# Patient Record
Sex: FEMALE | Race: BLACK OR AFRICAN AMERICAN | Employment: OTHER | URBAN - METROPOLITAN AREA
[De-identification: names, ages, dates, MRNs, and addresses within clinical notes are randomized per-mention and may not be internally consistent; named-entity substitution may affect disease eponyms.]

---

## 2021-10-25 ENCOUNTER — APPOINTMENT (OUTPATIENT)
Dept: GENERAL RADIOLOGY | Age: 61
End: 2021-10-25
Attending: EMERGENCY MEDICINE
Payer: MEDICARE

## 2021-10-25 ENCOUNTER — HOSPITAL ENCOUNTER (EMERGENCY)
Age: 61
Discharge: HOME | End: 2021-10-25
Attending: EMERGENCY MEDICINE
Payer: MEDICARE

## 2021-10-25 VITALS
DIASTOLIC BLOOD PRESSURE: 93 MMHG | WEIGHT: 230 LBS | SYSTOLIC BLOOD PRESSURE: 171 MMHG | OXYGEN SATURATION: 100 % | TEMPERATURE: 98.6 F | HEART RATE: 79 BPM | RESPIRATION RATE: 16 BRPM

## 2021-10-25 DIAGNOSIS — J18.9 COMMUNITY ACQUIRED PNEUMONIA OF RIGHT MIDDLE LOBE OF LUNG: Primary | ICD-10-CM

## 2021-10-25 PROCEDURE — 74011250637 HC RX REV CODE- 250/637: Performed by: EMERGENCY MEDICINE

## 2021-10-25 PROCEDURE — 71046 X-RAY EXAM CHEST 2 VIEWS: CPT

## 2021-10-25 PROCEDURE — 99283 EMERGENCY DEPT VISIT LOW MDM: CPT

## 2021-10-25 RX ORDER — DOXYCYCLINE 100 MG/1
100 CAPSULE ORAL
Status: COMPLETED | OUTPATIENT
Start: 2021-10-25 | End: 2021-10-25

## 2021-10-25 RX ORDER — DOXYCYCLINE 100 MG/1
100 CAPSULE ORAL 2 TIMES DAILY
Qty: 14 CAPSULE | Refills: 0 | Status: SHIPPED | OUTPATIENT
Start: 2021-10-25 | End: 2021-11-01

## 2021-10-25 RX ADMIN — DOXYCYCLINE HYCLATE 100 MG: 100 CAPSULE ORAL at 21:33

## 2021-10-26 NOTE — ROUTINE PROCESS
Evy Melissa is a 59 y.o. female that was discharged in stable. Pt was accompanied by self. Pt is driving. The patients diagnosis, condition and treatment were explained to  patient and aftercare instructions were given. The patient verbalized understanding. Patient armband removed and shredded.

## 2021-10-26 NOTE — ED PROVIDER NOTES
EMERGENCY DEPARTMENT HISTORY AND PHYSICAL EXAM    8:03 PM  Date: (Not on file)  Patient Name: Zainab Beyer    History of Presenting Illness     No chief complaint on file. History Provided By: Patient    HPI: Zainab Beyer is a 59 y.o. female with history of hypertension. Patient is presenting with nonproductive cough for the past 3 days. She is also describing left-sided chest pain that is worse with cough and when she lays on her left side. Reports some shortness of breath as well. No history of fever or chills. Patient states that symptoms feel similar to her previous episodes of pneumonia and she usually gets pneumonia around this time of the year. Up-to-date on her Covid and flu vaccination. Denies any sick contacts. GI symptoms    Location:  Severity:  Timing/course: Onset/Duration:     PCP: No primary care provider on file. Past History     Past Medical History:  No past medical history on file. Past Surgical History:  No past surgical history on file. Family History:  No family history on file. Social History:  Social History     Tobacco Use    Smoking status: Not on file   Substance Use Topics    Alcohol use: Not on file    Drug use: Not on file       Allergies:  Not on File    Review of Systems   Review of Systems   Constitutional: Positive for fatigue. HENT: Positive for congestion. Respiratory: Positive for cough and shortness of breath. All other systems reviewed and are negative. Physical Exam     No data found. Physical Exam  Vitals and nursing note reviewed. Constitutional:       General: She is not in acute distress. Appearance: She is not ill-appearing. HENT:      Head: Normocephalic and atraumatic. Cardiovascular:      Rate and Rhythm: Normal rate. Heart sounds: Normal heart sounds. Pulmonary:      Effort: Pulmonary effort is normal.      Breath sounds: Examination of the right-middle field reveals rhonchi. Rhonchi present. Musculoskeletal:         General: Normal range of motion. Cervical back: Normal range of motion and neck supple. Skin:     General: Skin is warm and dry. Neurological:      General: No focal deficit present. Mental Status: She is alert and oriented to person, place, and time. Psychiatric:         Mood and Affect: Mood normal.         Behavior: Behavior normal.         Diagnostic Study Results     Labs -  No results found for this or any previous visit (from the past 12 hour(s)). Radiologic Studies -   No results found. Medical Decision Making     ED Course: Progress Notes, Reevaluation, and Consults:    8:03 PM Initial assessment performed. The patients presenting problems have been discussed, and they/their family are in agreement with the care plan formulated and outlined with them. I have encouraged them to ask questions as they arise throughout their visit. Provider Notes (Medical Decision Making): 73-year-old female presenting with shortness of breath and cough, URI symptoms for the past 3 days. She is very well-appearing on exam, not tachycardic nor hypoxic. The chest discomfort that she describes is only when she lays on her left side and not concerning for cardiac etiology, likely related to her cough. Chest x-ray was ordered and possibly right middle lobe pneumonia, some fluid layering on the lateral x-ray. I will treat the patient symptomatically with doxycycline. She did have some rhonchi in the right middle field. She is evaluate well-appearing and feels well right now and her vitals are or normal and seems reliable so I do not think she necessarily needs blood work because the patient qualifies for outpatient treatment. Will discharge on Doxy and strict return precautions. Patient verbalized understanding and feels comfortable with this plan. Procedures:     Critical Care Time:     Vital Signs-Reviewed the patient's vital signs.  Reviewed pt's pulse ox reading. EKG: Interpreted by the EP. Time Interpreted:    Rate:    Rhythm:    Interpretation:   Comparison:     Records Reviewed: Nursing Notes and Old Medical Records (Time of Review: 8:03 PM)  -I am the first provider for this patient.  -I reviewed the vital signs, available nursing notes, past medical history, past surgical history, family history and social history. Clinical Impression     Clinical Impression: No diagnosis found. Disposition: dc        This note was dictated utilizing voice recognition software which may lead to typographical errors. I apologize in advance if the situation occurs. If questions arise please do not hesitate to contact me or call our department.     Maryanne Chavez MD  8:03 PM

## 2022-07-25 ENCOUNTER — NEW PATIENT (OUTPATIENT)
Dept: URBAN - NONMETROPOLITAN AREA CLINIC 6 | Facility: CLINIC | Age: 62
End: 2022-07-25

## 2022-07-25 DIAGNOSIS — Z96.1: ICD-10-CM

## 2022-07-25 DIAGNOSIS — H10.13: ICD-10-CM

## 2022-07-25 PROCEDURE — 99203 OFFICE O/P NEW LOW 30 MIN: CPT

## 2022-07-25 RX ORDER — LOTEPREDNOL ETABONATE 5 MG/G: 1 GEL OPHTHALMIC TWICE A DAY

## 2022-07-25 ASSESSMENT — VISUAL ACUITY
OU_SC: 20/30
OD_SC: 20/25
OS_SC: 20/50-1

## 2022-12-23 ENCOUNTER — COMPREHENSIVE EXAM (OUTPATIENT)
Dept: URBAN - NONMETROPOLITAN AREA CLINIC 6 | Facility: CLINIC | Age: 62
End: 2022-12-23

## 2022-12-23 PROCEDURE — 92015 DETERMINE REFRACTIVE STATE: CPT

## 2022-12-23 PROCEDURE — 92014 COMPRE OPH EXAM EST PT 1/>: CPT

## 2022-12-23 ASSESSMENT — KERATOMETRY
OD_K1POWER_DIOPTERS: 43.75
OD_AXISANGLE_DEGREES: 102
OS_AXISANGLE2_DEGREES: 142
OD_K2POWER_DIOPTERS: 44.75
OS_K1POWER_DIOPTERS: 44.75
OS_K2POWER_DIOPTERS: 45.50
OS_AXISANGLE_DEGREES: 52
OD_AXISANGLE2_DEGREES: 12

## 2022-12-23 ASSESSMENT — VISUAL ACUITY
OS_SC: 20/40-2
OD_SC: 20/50
OU_SC: 20/40-2

## 2022-12-23 ASSESSMENT — TONOMETRY
OS_IOP_MMHG: 12
OD_IOP_MMHG: 13

## 2023-01-15 ENCOUNTER — HOSPITAL ENCOUNTER (EMERGENCY)
Age: 63
Discharge: HOME | End: 2023-01-15
Attending: EMERGENCY MEDICINE
Payer: MEDICARE

## 2023-01-15 VITALS
RESPIRATION RATE: 20 BRPM | TEMPERATURE: 99.4 F | WEIGHT: 217 LBS | SYSTOLIC BLOOD PRESSURE: 163 MMHG | DIASTOLIC BLOOD PRESSURE: 96 MMHG | HEART RATE: 101 BPM | BODY MASS INDEX: 34.06 KG/M2 | OXYGEN SATURATION: 98 % | HEIGHT: 67 IN

## 2023-01-15 DIAGNOSIS — L03.011 PARONYCHIA OF RIGHT MIDDLE FINGER: Primary | ICD-10-CM

## 2023-01-15 LAB — GLUCOSE BLD STRIP.AUTO-MCNC: 102 MG/DL (ref 70–110)

## 2023-01-15 PROCEDURE — 75810000289 HC I&D ABSCESS SIMP/COMP/MULT

## 2023-01-15 PROCEDURE — 82962 GLUCOSE BLOOD TEST: CPT

## 2023-01-15 PROCEDURE — 99283 EMERGENCY DEPT VISIT LOW MDM: CPT

## 2023-01-15 PROCEDURE — 74011000250 HC RX REV CODE- 250: Performed by: EMERGENCY MEDICINE

## 2023-01-15 RX ORDER — LIDOCAINE HYDROCHLORIDE 10 MG/ML
10 INJECTION, SOLUTION EPIDURAL; INFILTRATION; INTRACAUDAL; PERINEURAL ONCE
Status: COMPLETED | OUTPATIENT
Start: 2023-01-15 | End: 2023-01-15

## 2023-01-15 RX ORDER — AMOXICILLIN AND CLAVULANATE POTASSIUM 875; 125 MG/1; MG/1
1 TABLET, FILM COATED ORAL 2 TIMES DAILY
Qty: 20 TABLET | Refills: 0 | Status: SHIPPED | OUTPATIENT
Start: 2023-01-15 | End: 2023-01-25

## 2023-01-15 RX ORDER — IBUPROFEN 800 MG/1
800 TABLET ORAL EVERY 8 HOURS
Qty: 9 TABLET | Refills: 0 | Status: SHIPPED | OUTPATIENT
Start: 2023-01-15 | End: 2023-01-18

## 2023-01-15 RX ADMIN — LIDOCAINE HYDROCHLORIDE 10 ML: 10 INJECTION, SOLUTION EPIDURAL; INFILTRATION; INTRACAUDAL; PERINEURAL at 14:18

## 2023-01-15 NOTE — ED PROVIDER NOTES
EMERGENCY DEPARTMENT HISTORY AND PHYSICAL EXAM    2:04 PM seen at this time in triage room 1      Date: 1/15/2023  Patient Name: Reida Hodgkin    History of Presenting Illness     Chief Complaint   Patient presents with    Abscess         History Provided By: patient    Additional History (Context): Reida Hodgkin is a 57 y.o. female presents with borderline diabetes not on any medications  Has 4 days of pain forming at the third digit on the right side distal part, swelling and redness and whiteness forming thinks it started with an ingrown nail. Pain is severe. Pain is constant. Yessenia Ruffing PCP: Grace Carney MD    Chief Complaint:   Duration:    Timing:    Location:   Quality:   Severity:   Modifying Factors:   Associated Symptoms:       Current Facility-Administered Medications   Medication Dose Route Frequency Provider Last Rate Last Admin    lidocaine (PF) (XYLOCAINE) 10 mg/mL (1 %) injection 10 mL  10 mL IntraDERMal ONCE Juliette Cristobal MD           Past History     Past Medical History:  Past Medical History:   Diagnosis Date    Acid reflux     Aortic valve insufficiency     Bipolar 1 disorder (HCC)     Depression     Hypertension     Pneumonia     Renal cell carcinoma (HCC)        Past Surgical History:  Past Surgical History:   Procedure Laterality Date    HX NEPHRECTOMY         Family History:  No family history on file. Social History: Allergies:   Allergies   Allergen Reactions    Shrimp Hives    Ace Inhibitors Other (comments)    Fish Containing Products Other (comments)         Review of Systems     Review of Systems      Physical Exam       Patient Vitals for the past 12 hrs:   Temp Pulse Resp BP SpO2   01/15/23 1358 99.4 °F (37.4 °C) (!) 101 20 (!) 163/96 98 %       IPVITALS  Patient Vitals for the past 24 hrs:   BP Temp Pulse Resp SpO2 Height Weight   01/15/23 1358 (!) 163/96 99.4 °F (37.4 °C) (!) 101 20 98 % 5' 7\" (1.702 m) 98.4 kg (217 lb)       Physical Exam  Vitals and nursing note reviewed. Constitutional:       Appearance: She is well-developed. HENT:      Head: Normocephalic and atraumatic. Eyes:      General: No scleral icterus. Conjunctiva/sclera: Conjunctivae normal.   Neck:      Vascular: No JVD. Cardiovascular:      Rate and Rhythm: Normal rate and regular rhythm. Pulmonary:      Effort: Pulmonary effort is normal. No respiratory distress. Musculoskeletal:         General: Normal range of motion. Cervical back: Normal range of motion and neck supple. Comments: Right third digit, all surrounding the nail is fluctuant painful white and the finger is swollen suggesting a large paronychial abscess. Skin:     General: Skin is warm and dry. Neurological:      Mental Status: She is alert. Psychiatric:         Thought Content: Thought content normal.         Judgment: Judgment normal.         Diagnostic Study Results   Labs -  No results found for this or any previous visit (from the past 24 hour(s)). Radiologic Studies -   No orders to display     No results found. Medications ordered:   Medications   lidocaine (PF) (XYLOCAINE) 10 mg/mL (1 %) injection 10 mL (has no administration in time range)         Medical Decision Making   Initial Medical Decision Making and DDx:  Consistent with paronychial abscess, will drain. No complications to the drainage excellent anesthesia, patient tolerated well, home with antibiotics, her choice of pain medication is ibuprofen. Follow-up with hand surgery.   Not consistent with flexor tenosynovitis or felon    ED Course: Progress Notes, Reevaluation, and Consults:  ED Course as of 01/15/23 1528   Sun Danish 15, 2023   1457 Repeated lidocaine injection for digital block [CB]      ED Course User Index  [CB] Yelitza Estevez MD     I&D Abcess Simple    Date/Time: 1/15/2023 3:32 PM  Performed by: Yelitza Estevez MD  Authorized by: Yelitza Estevez MD     Location:     Indications for incision and drainage: Right third digit paronychia. Pre-procedure details:     Skin preparation:  Povidone-iodine  Anesthesia:     Anesthesia method:  Nerve block    Block needle gauge:  24 G    Block anesthetic:  Lidocaine 1% w/o epi    Block technique:  Right third digit digital block    Block outcome:  Anesthesia achieved  Procedure type:     Complexity:  Simple  Procedure details:     Incision types:  Single straight    Drainage:  Purulent    Drainage amount: Moderate    Wound treatment:  Wound left open  Post-procedure details:     Procedure completion:  Tolerated      I am the first provider for this patient. I reviewed the vital signs, available nursing notes, past medical history, past surgical history, family history and social history. Patient Vitals for the past 12 hrs:   Temp Pulse Resp BP SpO2   01/15/23 1358 99.4 °F (37.4 °C) (!) 101 20 (!) 163/96 98 %       Vital Signs-Reviewed the patient's vital signs. Pulse Oximetry Analysis, Cardiac Monitor, 12 lead ekg:      Interpreted by the EP. Records Reviewed: Nursing notes reviewed (Time of Review: 2:04 PM)    Procedures:   Critical Care Time: 0  If critical care time is note it is exclusive of any separately billable procedures. Aspirin: (was aspirin given for stroke?)    Diagnosis     Clinical Impression: No diagnosis found.     Disposition:       Follow-up Information    None          Patient's Medications    No medications on file     _______________________________    Notes:    Sheeba Purvis MD using Dragon dictation      _______________________________

## 2023-01-15 NOTE — ED NOTES
Pt is alert and oriented, and does not appear to be in distress. Pt tolerated procedure well. Will continue to monitor.

## 2023-01-15 NOTE — ED TRIAGE NOTES
Py arrives with a right middle finger injury/possible abscess. Pt states she may be diabetic and has a significant medical history. Pt recently had a TAVR procedure 10/2023. A/O x 4.

## 2023-01-20 ENCOUNTER — TELEPHONE (OUTPATIENT)
Dept: ORTHOPEDIC SURGERY | Age: 63
End: 2023-01-20

## 2023-01-20 NOTE — TELEPHONE ENCOUNTER
Driss Razo from [facility]  called and was checking to see if a referral for Lonnell Babinski has been received. She said she has faxed it twice. The referral is for Lonnell Babinski to see Dr. Tyler Sanchez has requested a call back at [phone].

## 2023-01-31 NOTE — TELEPHONE ENCOUNTER
We have not received her referral here at [facility] but I did call the patient to try to get her scheduled

## 2023-03-22 ENCOUNTER — OFFICE VISIT (OUTPATIENT)
Age: 63
End: 2023-03-22
Payer: COMMERCIAL

## 2023-03-22 VITALS
HEIGHT: 67 IN | HEART RATE: 77 BPM | TEMPERATURE: 97.9 F | WEIGHT: 213 LBS | OXYGEN SATURATION: 97 % | BODY MASS INDEX: 33.43 KG/M2

## 2023-03-22 DIAGNOSIS — M54.2 CERVICAL PAIN: ICD-10-CM

## 2023-03-22 DIAGNOSIS — M79.18 MYOFASCIAL PAIN: ICD-10-CM

## 2023-03-22 DIAGNOSIS — M54.50 LUMBAR PAIN: Primary | ICD-10-CM

## 2023-03-22 DIAGNOSIS — M25.511 BILATERAL SHOULDER PAIN, UNSPECIFIED CHRONICITY: ICD-10-CM

## 2023-03-22 DIAGNOSIS — R53.81 PHYSICAL DECONDITIONING: ICD-10-CM

## 2023-03-22 DIAGNOSIS — M25.512 BILATERAL SHOULDER PAIN, UNSPECIFIED CHRONICITY: ICD-10-CM

## 2023-03-22 DIAGNOSIS — M79.18 MUSCULOSKELETAL PAIN: ICD-10-CM

## 2023-03-22 DIAGNOSIS — R20.0 NUMBNESS OF LEFT LOWER EXTREMITY: ICD-10-CM

## 2023-03-22 DIAGNOSIS — M54.9 UPPER BACK PAIN: ICD-10-CM

## 2023-03-22 PROCEDURE — 1036F TOBACCO NON-USER: CPT | Performed by: PHYSICAL MEDICINE & REHABILITATION

## 2023-03-22 PROCEDURE — 1123F ACP DISCUSS/DSCN MKR DOCD: CPT | Performed by: PHYSICAL MEDICINE & REHABILITATION

## 2023-03-22 PROCEDURE — G8484 FLU IMMUNIZE NO ADMIN: HCPCS | Performed by: PHYSICAL MEDICINE & REHABILITATION

## 2023-03-22 PROCEDURE — 99204 OFFICE O/P NEW MOD 45 MIN: CPT | Performed by: PHYSICAL MEDICINE & REHABILITATION

## 2023-03-22 PROCEDURE — G8417 CALC BMI ABV UP PARAM F/U: HCPCS | Performed by: PHYSICAL MEDICINE & REHABILITATION

## 2023-03-22 PROCEDURE — G8400 PT W/DXA NO RESULTS DOC: HCPCS | Performed by: PHYSICAL MEDICINE & REHABILITATION

## 2023-03-22 PROCEDURE — G8427 DOCREV CUR MEDS BY ELIG CLIN: HCPCS | Performed by: PHYSICAL MEDICINE & REHABILITATION

## 2023-03-22 PROCEDURE — 3017F COLORECTAL CA SCREEN DOC REV: CPT | Performed by: PHYSICAL MEDICINE & REHABILITATION

## 2023-03-22 PROCEDURE — 1090F PRES/ABSN URINE INCON ASSESS: CPT | Performed by: PHYSICAL MEDICINE & REHABILITATION

## 2023-03-22 RX ORDER — EVOLOCUMAB 140 MG/ML
INJECTION, SOLUTION SUBCUTANEOUS
COMMUNITY
Start: 2023-02-21

## 2023-03-22 RX ORDER — TRAZODONE HYDROCHLORIDE 100 MG/1
100 TABLET ORAL DAILY
COMMUNITY

## 2023-03-22 RX ORDER — MECLIZINE HYDROCHLORIDE 25 MG/1
25 TABLET ORAL DAILY PRN
COMMUNITY
Start: 2019-05-20

## 2023-03-22 RX ORDER — FLUTICASONE PROPIONATE 50 MCG
SPRAY, SUSPENSION (ML) NASAL
COMMUNITY

## 2023-03-22 RX ORDER — TRAMADOL HYDROCHLORIDE 50 MG/1
50 TABLET ORAL EVERY 6 HOURS PRN
COMMUNITY
Start: 2022-10-28

## 2023-03-22 RX ORDER — MIRTAZAPINE 15 MG/1
15 TABLET, FILM COATED ORAL
COMMUNITY

## 2023-03-22 RX ORDER — FOLIC ACID 1 MG/1
TABLET ORAL DAILY
COMMUNITY

## 2023-03-22 RX ORDER — ALBUTEROL SULFATE 2.5 MG/3ML
2.5 SOLUTION RESPIRATORY (INHALATION) EVERY 6 HOURS PRN
COMMUNITY

## 2023-03-22 RX ORDER — PREGABALIN 75 MG/1
75 CAPSULE ORAL NIGHTLY
COMMUNITY

## 2023-03-22 RX ORDER — CETIRIZINE HYDROCHLORIDE 10 MG/1
TABLET ORAL
COMMUNITY

## 2023-03-22 RX ORDER — METOPROLOL SUCCINATE 25 MG/1
25 TABLET, EXTENDED RELEASE ORAL DAILY
COMMUNITY

## 2023-03-22 RX ORDER — ASPIRIN 81 MG/1
81 TABLET, CHEWABLE ORAL DAILY
COMMUNITY

## 2023-03-22 RX ORDER — VENLAFAXINE HYDROCHLORIDE 75 MG/1
CAPSULE, EXTENDED RELEASE ORAL DAILY
COMMUNITY

## 2023-03-22 RX ORDER — ROSUVASTATIN CALCIUM 20 MG/1
TABLET, FILM COATED ORAL
COMMUNITY
Start: 2023-01-31

## 2023-03-22 RX ORDER — MIRTAZAPINE 15 MG/1
TABLET, FILM COATED ORAL
COMMUNITY
Start: 2023-01-31

## 2023-03-22 RX ORDER — AMOXICILLIN AND CLAVULANATE POTASSIUM 875; 125 MG/1; MG/1
1 TABLET, FILM COATED ORAL 2 TIMES DAILY
COMMUNITY
Start: 2023-01-15

## 2023-03-22 RX ORDER — AMLODIPINE BESYLATE 10 MG
TABLET ORAL
COMMUNITY
Start: 2023-01-06

## 2023-03-22 RX ORDER — HYDROCODONE BITARTRATE AND ACETAMINOPHEN 5; 325 MG/1; MG/1
1 TABLET ORAL EVERY 6 HOURS PRN
COMMUNITY
Start: 2019-05-24

## 2023-03-22 RX ORDER — PHENTERMINE HYDROCHLORIDE 37.5 MG/1
37.5 TABLET ORAL
COMMUNITY

## 2023-03-22 RX ORDER — MOMETASONE FUROATE 50 UG/1
2 SPRAY, METERED NASAL DAILY
COMMUNITY

## 2023-03-22 RX ORDER — ISOSORBIDE MONONITRATE 30 MG/1
30 TABLET, EXTENDED RELEASE ORAL EVERY MORNING
COMMUNITY
Start: 2023-01-09

## 2023-03-22 RX ORDER — FERROUS SULFATE 325(65) MG
TABLET ORAL
COMMUNITY

## 2023-03-22 RX ORDER — ALBUTEROL SULFATE 90 UG/1
AEROSOL, METERED RESPIRATORY (INHALATION)
COMMUNITY
Start: 2023-02-01

## 2023-03-22 RX ORDER — ERGOCALCIFEROL 1.25 MG/1
50000 CAPSULE ORAL
COMMUNITY

## 2023-03-22 RX ORDER — SUNITINIB MALATE 37.5 MG/1
37.5 CAPSULE ORAL DAILY
COMMUNITY

## 2023-03-22 RX ORDER — ALBUTEROL SULFATE 90 UG/1
AEROSOL, METERED RESPIRATORY (INHALATION) EVERY 4 HOURS
COMMUNITY

## 2023-03-22 RX ORDER — AMITRIPTYLINE HYDROCHLORIDE 25 MG/1
50 TABLET, FILM COATED ORAL NIGHTLY
COMMUNITY

## 2023-03-22 RX ORDER — ONDANSETRON 4 MG/1
4 TABLET, ORALLY DISINTEGRATING ORAL
COMMUNITY

## 2023-03-22 RX ORDER — AMLODIPINE BESYLATE 10 MG/1
5 TABLET ORAL
COMMUNITY

## 2023-03-22 RX ORDER — GUAIFENESIN DEXTROMETHORPHAN HYDROBROMIDE ORAL SOLUTION 10; 100 MG/5ML; MG/5ML
10 SOLUTION ORAL EVERY 6 HOURS PRN
COMMUNITY
Start: 2020-06-13

## 2023-03-22 RX ORDER — ARIPIPRAZOLE 5 MG/1
5 TABLET ORAL
COMMUNITY

## 2023-03-22 RX ORDER — TOPIRAMATE 25 MG/1
25 TABLET ORAL
COMMUNITY

## 2023-03-22 RX ORDER — IBUPROFEN 800 MG/1
TABLET ORAL
COMMUNITY
Start: 2023-01-15

## 2023-03-22 RX ORDER — SUCRALFATE 1 G/1
1 TABLET ORAL
COMMUNITY
Start: 2019-03-22

## 2023-03-22 RX ORDER — HYDROXYZINE HYDROCHLORIDE 25 MG/1
25 TABLET, FILM COATED ORAL 3 TIMES DAILY
COMMUNITY

## 2023-03-22 RX ORDER — ISOSORBIDE MONONITRATE 30 MG/1
1 TABLET, EXTENDED RELEASE ORAL EVERY MORNING
COMMUNITY
Start: 2023-01-09

## 2023-03-22 RX ORDER — TOPIRAMATE 100 MG/1
100 TABLET, FILM COATED ORAL 2 TIMES DAILY
COMMUNITY
Start: 2023-02-27 | End: 2024-02-27

## 2023-03-22 RX ORDER — SERTRALINE HYDROCHLORIDE 100 MG/1
100 TABLET, FILM COATED ORAL DAILY
COMMUNITY

## 2023-03-22 RX ORDER — TRAZODONE HYDROCHLORIDE 100 MG/1
TABLET ORAL
COMMUNITY
Start: 2023-01-31

## 2023-03-22 RX ORDER — GABAPENTIN 300 MG/1
300 CAPSULE ORAL NIGHTLY
COMMUNITY

## 2023-03-22 RX ORDER — NITROGLYCERIN 0.4 MG/1
TABLET SUBLINGUAL
COMMUNITY
Start: 2023-02-04

## 2023-03-22 RX ORDER — ACETAMINOPHEN 325 MG/1
650 TABLET ORAL
COMMUNITY

## 2023-03-22 RX ORDER — VENLAFAXINE HYDROCHLORIDE 75 MG/1
CAPSULE, EXTENDED RELEASE ORAL
COMMUNITY
Start: 2023-01-31

## 2023-03-22 RX ORDER — CALCIUM CARBONATE/VITAMIN D3 600 MG-20
TABLET,CHEWABLE ORAL
COMMUNITY

## 2023-03-22 RX ORDER — OMEPRAZOLE 40 MG/1
CAPSULE, DELAYED RELEASE ORAL
COMMUNITY
Start: 2023-01-31

## 2023-03-22 RX ORDER — AMITRIPTYLINE HYDROCHLORIDE 150 MG/1
150 TABLET, FILM COATED ORAL DAILY
COMMUNITY

## 2023-03-22 RX ORDER — PANTOPRAZOLE SODIUM 40 MG/1
40 TABLET, DELAYED RELEASE ORAL DAILY
COMMUNITY
Start: 2019-03-18

## 2023-03-22 RX ORDER — ATORVASTATIN CALCIUM 40 MG/1
40 TABLET, FILM COATED ORAL NIGHTLY
COMMUNITY

## 2023-03-22 RX ORDER — ESTRADIOL 0.1 MG/G
CREAM VAGINAL
COMMUNITY
Start: 2022-05-16

## 2023-03-22 ASSESSMENT — PATIENT HEALTH QUESTIONNAIRE - PHQ9
SUM OF ALL RESPONSES TO PHQ QUESTIONS 1-9: 2
SUM OF ALL RESPONSES TO PHQ9 QUESTIONS 1 & 2: 2
2. FEELING DOWN, DEPRESSED OR HOPELESS: 1
1. LITTLE INTEREST OR PLEASURE IN DOING THINGS: 1
SUM OF ALL RESPONSES TO PHQ QUESTIONS 1-9: 2

## 2023-03-22 ASSESSMENT — ENCOUNTER SYMPTOMS
WHEEZING: 0
VOMITING: 0
NAUSEA: 0
TROUBLE SWALLOWING: 0
SHORTNESS OF BREATH: 0
BACK PAIN: 1

## 2023-03-22 NOTE — PROGRESS NOTES
Bert Sender presents today for   Chief Complaint   Patient presents with    Back Pain       Is someone accompanying this pt? no    Is the patient using any DME equipment during OV? no    Depression Screening:  No flowsheet data found. Learning Assessment:  No flowsheet data found. Abuse Screening:  No flowsheet data found. Fall Risk  No flowsheet data found. OPIOID RISK TOOL  No flowsheet data found. Coordination of Care:  1. Have you been to the ER, urgent care clinic since your last visit? no  Hospitalized since your last visit? no    2. Have you seen or consulted any other health care providers outside of the [address] [place] [address] since your last visit? no Include any pap smears or colon screening.  no
SPINE/MUSCULOSKELETAL EXAM  Back Exam     Tenderness   The patient is experiencing tenderness in the lumbar, cervical, thoracic and sacroiliac (lumbar paraspinals, cervical paraspinals, buttocks, SI joints, quadratus lumborum). Range of Motion   Extension:  normal   Flexion:  normal   Rotation right:  normal   Rotation left:  normal     Tests   Straight leg raise right: negative  Straight leg raise left: negative    Reflexes   Patellar:  2/4  Achilles:  2/4  Biceps:  2/4    Other   Toe walk: normal  Heel walk: normal  Sensation: decreased (Decreased sensation of LLE at medial left knee. Otherwise, normal sensation)      Right Shoulder Exam     Tenderness   The patient is experiencing no tenderness. Range of Motion   Active abduction:  abnormal     Tests   Parker test: positive           MOTOR:      Elbow Flex  Elbow Ext Arm Abd Wrist Ext Wrist Flex Hand Intrin   Right 5/5 5/5 5/5 5/5 5/5 5/5   Left 5/5 5/5 5/5 5/5 5/5 5/5             Hip flex  Knee Ext EHL Ankle DF Ankle PF      Right 5/5 5/5 5/5 5/5 5/5    Left 5/5 5/5 5/5 5/5 5/5        Ambulation without assistive device. FWB. ASSESSMENT  Patrick Guaman is a 70 y.o. female with chronic diffuse back pain. Her symptoms may be due to myofascial/musculoskeletal pain and lumbar facet arthropathy, based on diffuse tenderness throughout back and pain elicited upon lumbar extension>flexion. There may also be a component of physical deconditioning based on pmhx. She previously underwent treatment for kidney cancer; she is also s/p TAVR. She also has decreased sensation of medial left knee, and will further evaluate through MRI. She has previously had left knee surgery. She also c/o bilateral shoulder pain (R>>L), which may derive from a rotator cuff pathology based on positive Hawkin's sign of R.  Otherwise, patient is neurologically intact with normal strength and sensation       PLAN  Referral to PT - eval and treat of diffuse back pain (lumbar and

## 2023-04-04 ENCOUNTER — HOSPITAL ENCOUNTER (OUTPATIENT)
Facility: HOSPITAL | Age: 63
Setting detail: RECURRING SERIES
Discharge: HOME | End: 2023-04-07
Payer: COMMERCIAL

## 2023-04-04 PROCEDURE — 97161 PT EVAL LOW COMPLEX 20 MIN: CPT

## 2023-04-04 PROCEDURE — 97110 THERAPEUTIC EXERCISES: CPT

## 2023-04-04 PROCEDURE — 97140 MANUAL THERAPY 1/> REGIONS: CPT

## 2023-04-04 NOTE — PROGRESS NOTES
PHYSICAL / OCCUPATIONAL THERAPY - DAILY TREATMENT NOTE (updated )    Patient Name: Zoya Jackson    Date: 2023    : 1960  Insurance: Payor: Sven Gray / Plan: [employer] [place] / Product Type: *No Product type* /      Patient  verified Yes     Visit #   Current / Total 1 10   Time   In / Out 3:00 3:41   Pain   In / Out 6 6   Subjective Functional Status/Changes: See POC   Changes to:  Meds, Allergies, Med Hx, Sx Hx? If yes, update Summary List no       TREATMENT AREA =  Lumbar pain [M54.50]  Cervical pain [M54.2]  Myofascial pain [M79.18]  Musculoskeletal pain [M79.18]  Physical deconditioning [R53.81]  Numbness of left lower extremity [R20.0]  Upper back pain [M54.9]  Bilateral shoulder pain, unspecified chronicity [M25.511, M25.512]    OBJECTIVE    10 min   Eval - untimed                      Therapeutic Procedures: Tx Min Billable or 1:1 Min (if diff from Tx Min) Procedure, Rationale, Specifics   13 13 45028 Therapeutic Exercise (timed):  increase ROM, strength, coordination, balance, and proprioception to improve patient's ability to progress to PLOF and address remaining functional goals. (see flow sheet as applicable)     Details if applicable:  HEP instruction and demonstration     8 0 70025 Self Care/Home Management (timed):  improve patient knowledge and understanding of pain reducing techniques, positioning, posture/ergonomics, home safety, activity modification, diagnosis/prognosis, and physical therapy expectations, procedures and progression  to improve patient's ability to progress to PLOF and address remaining functional goals. (see flow sheet as applicable)     Details if applicable:  pt education on relevant anatomy/physiology    10 10 67892 Manual Therapy (timed):  decrease pain, increase ROM, increase tissue extensibility, decrease trigger points, and increase postural awareness to improve patient's ability to progress to PLOF and address remaining functional goals.   The manual
improve ability to perform household activities. Status at last note/certification: EXT 85-15% of WNL, flex 50-75% of WNL  4. Pt will report being able to walk several blocks with a little or no difficulty/pain to improve ability to tolerate community ambulation. Status at last note/certification limited a lot per FOTO     Frequency / Duration: Patient to be seen 2 times per week for 10 treatments  Goals will be assigned and reassessed every 10 visits/ 30 days per guidelines . Patient/ Caregiver education and instruction: Diagnosis, prognosis, self care, activity modification, and exercises [x]  Plan of care has been reviewed with DYLLAN Zamora, PT       4/4/2023       1:39 PM    I certify that the above Therapy Services are being furnished while the patient is under my care. I agree with the treatment plan and certify that this therapy is necessary. [de-identified] Signature:_________________________   DATE:_________   TIME:________                           Darryle Buttery, MD  Insurance: Payor: Bebo Garvin / Plan: [address] / Product Type: *No Product type* /      ** Signature, Date and Time must be completed for valid certification **  Please [facility] and [facility] or you may fax the signed copy to [phone]. Thank you.

## 2023-04-12 ENCOUNTER — APPOINTMENT (OUTPATIENT)
Facility: HOSPITAL | Age: 63
End: 2023-04-12
Payer: MEDICARE

## 2023-04-13 ENCOUNTER — HOSPITAL ENCOUNTER (OUTPATIENT)
Facility: HOSPITAL | Age: 63
Setting detail: RECURRING SERIES
Discharge: HOME | End: 2023-04-16
Payer: COMMERCIAL

## 2023-04-13 PROCEDURE — 97110 THERAPEUTIC EXERCISES: CPT

## 2023-04-13 PROCEDURE — 97112 NEUROMUSCULAR REEDUCATION: CPT

## 2023-04-14 ENCOUNTER — HOSPITAL ENCOUNTER (OUTPATIENT)
Facility: HOSPITAL | Age: 63
Setting detail: RECURRING SERIES
Discharge: HOME | End: 2023-04-17
Payer: COMMERCIAL

## 2023-04-14 PROCEDURE — 97110 THERAPEUTIC EXERCISES: CPT

## 2023-04-14 PROCEDURE — 97112 NEUROMUSCULAR REEDUCATION: CPT

## 2023-04-17 ENCOUNTER — HOSPITAL ENCOUNTER (OUTPATIENT)
Facility: HOSPITAL | Age: 63
Setting detail: RECURRING SERIES
Discharge: HOME | End: 2023-04-20
Payer: COMMERCIAL

## 2023-04-17 PROCEDURE — 97110 THERAPEUTIC EXERCISES: CPT

## 2023-04-17 PROCEDURE — 97112 NEUROMUSCULAR REEDUCATION: CPT

## 2023-04-17 NOTE — PROGRESS NOTES
CARDIAC EXERCISE [facility]   5/3/2023 11:10 AM Meri Robledo, PT [facility] [place]   5/5/2023 10:00 AM [facility] CARDIAC EXERCISE [facility]   5/8/2023 10:00 AM [facility] CARDIAC EXERCISE [facility]   5/8/2023 11:10 AM Mercy Roseareli, PTA [facility] [place]   5/10/2023 10:00 AM [facility] CARDIAC EXERCISE [facility]   5/12/2023 10:00 AM [facility] CARDIAC EXERCISE [facility]   5/15/2023 10:00 AM [facility] CARDIAC EXERCISE [facility]   5/17/2023 10:00 AM [facility] CARDIAC EXERCISE [facility]   5/19/2023 10:00 AM [facility] CARDIAC EXERCISE [facility]   5/22/2023 10:00 AM [facility] CARDIAC EXERCISE [facility]   5/24/2023 10:00 AM [facility] CARDIAC EXERCISE [facility]   5/26/2023 10:00 AM [facility] CARDIAC EXERCISE [facility]   5/31/2023 10:00 AM [facility] CARDIAC EXERCISE [facility]   6/2/2023 10:00 AM [facility] CARDIAC EXERCISE [facility]   6/5/2023 10:00 AM [facility] CARDIAC EXERCISE [facility]   6/7/2023 10:00 AM [facility] CARDIAC EXERCISE [facility]   6/9/2023 10:00 AM [facility] CARDIAC EXERCISE [facility]   7/10/2023 10:15 AM Shena Obregon MD [facility]

## 2023-04-18 ENCOUNTER — APPOINTMENT (OUTPATIENT)
Facility: HOSPITAL | Age: 63
End: 2023-04-18
Payer: MEDICARE

## 2023-04-19 ENCOUNTER — APPOINTMENT (OUTPATIENT)
Facility: HOSPITAL | Age: 63
End: 2023-04-19
Payer: MEDICARE

## 2023-04-20 ENCOUNTER — HOSPITAL ENCOUNTER (OUTPATIENT)
Facility: HOSPITAL | Age: 63
Setting detail: RECURRING SERIES
Discharge: HOME | End: 2023-04-23
Payer: COMMERCIAL

## 2023-04-20 VITALS — BODY MASS INDEX: 33.67 KG/M2 | WEIGHT: 215 LBS

## 2023-04-20 LAB — GLUCOSE BLD STRIP.AUTO-MCNC: 112 MG/DL (ref 70–110)

## 2023-04-20 PROCEDURE — 93798 PHYS/QHP OP CAR RHAB W/ECG: CPT

## 2023-04-20 PROCEDURE — 97112 NEUROMUSCULAR REEDUCATION: CPT

## 2023-04-20 PROCEDURE — 97110 THERAPEUTIC EXERCISES: CPT

## 2023-04-20 PROCEDURE — 82962 GLUCOSE BLOOD TEST: CPT

## 2023-04-20 PROCEDURE — 97140 MANUAL THERAPY 1/> REGIONS: CPT

## 2023-04-20 ASSESSMENT — EXERCISE STRESS TEST
PEAK_RPE: 13
PEAK_HR: 118
PEAK_METS: 3.1

## 2023-04-24 ENCOUNTER — HOSPITAL ENCOUNTER (OUTPATIENT)
Facility: HOSPITAL | Age: 63
Setting detail: RECURRING SERIES
Discharge: HOME | End: 2023-04-27
Payer: COMMERCIAL

## 2023-04-24 PROCEDURE — 97140 MANUAL THERAPY 1/> REGIONS: CPT

## 2023-04-24 PROCEDURE — 97110 THERAPEUTIC EXERCISES: CPT

## 2023-04-24 PROCEDURE — 97112 NEUROMUSCULAR REEDUCATION: CPT

## 2023-04-25 ENCOUNTER — HOSPITAL ENCOUNTER (OUTPATIENT)
Facility: HOSPITAL | Age: 63
Setting detail: RECURRING SERIES
Discharge: HOME | End: 2023-04-28
Payer: COMMERCIAL

## 2023-04-25 VITALS — WEIGHT: 214 LBS | BODY MASS INDEX: 33.52 KG/M2

## 2023-04-25 LAB — GLUCOSE BLD STRIP.AUTO-MCNC: 211 MG/DL (ref 70–110)

## 2023-04-25 PROCEDURE — 93798 PHYS/QHP OP CAR RHAB W/ECG: CPT

## 2023-04-25 PROCEDURE — 97140 MANUAL THERAPY 1/> REGIONS: CPT

## 2023-04-25 PROCEDURE — 97110 THERAPEUTIC EXERCISES: CPT

## 2023-04-25 PROCEDURE — 82962 GLUCOSE BLOOD TEST: CPT

## 2023-04-25 PROCEDURE — 97112 NEUROMUSCULAR REEDUCATION: CPT

## 2023-04-25 ASSESSMENT — EXERCISE STRESS TEST
PEAK_METS: 3.1
PEAK_HR: 123
PEAK_RPE: 13

## 2023-04-25 NOTE — PROGRESS NOTES
PHYSICAL / OCCUPATIONAL THERAPY - DAILY TREATMENT NOTE (updated )    Patient Name: Mihaela Love    Date: 2023    : 1960  Insurance: Payor: Abhishek Landeros / Plan: [employer] [place] / Product Type: *No Product type* /      Patient  verified Yes     Visit #   Current / Total 7 10   Time   In / Out 10:18 11:00   Pain   In / Out 2 2   Subjective Functional Status/Changes: Pt reports feeling better today   Changes to:  Meds, Allergies, Med Hx, Sx Hx? If yes, update Summary List no       TREATMENT AREA =  Lumbar pain [M54.50]  Cervical pain [M54.2]  Myofascial pain [M79.18]  Musculoskeletal pain [M79.18]  Physical deconditioning [R53.81]  Numbness of left lower extremity [R20.0]  Upper back pain [M54.9]  Bilateral shoulder pain, unspecified chronicity [M25.511, M25.512]    OBJECTIVE    Therapeutic Procedures: Tx Min Billable or 1:1 Min (if diff from Tx Min) Procedure, Rationale, Specifics   24  67541 Therapeutic Exercise (timed):  increase ROM, strength, coordination, balance, and proprioception to improve patient's ability to progress to PLOF and address remaining functional goals. (see flow sheet as applicable)     Details if applicable:       10  47235 Neuromuscular Re-Education (timed):  improve balance, coordination, kinesthetic sense, posture, core stability and proprioception to improve patient's ability to develop conscious control of individual muscles and awareness of position of extremities in order to progress to PLOF and address remaining functional goals. (see flow sheet as applicable)     Details if applicable:     8  99522 Manual Therapy (timed):  decrease pain, increase ROM, and increase tissue extensibility to improve patient's ability to progress to PLOF and address remaining functional goals. The manual therapy interventions were performed at a separate and distinct time from the therapeutic activities interventions .  (see flow sheet as applicable)     Details if applicable:  SI check; STM

## 2023-04-27 ENCOUNTER — HOSPITAL ENCOUNTER (OUTPATIENT)
Facility: HOSPITAL | Age: 63
Setting detail: RECURRING SERIES
Discharge: HOME | End: 2023-04-30
Payer: MEDICARE

## 2023-04-27 VITALS — BODY MASS INDEX: 33.52 KG/M2 | WEIGHT: 214 LBS

## 2023-04-27 LAB — GLUCOSE BLD STRIP.AUTO-MCNC: 88 MG/DL (ref 70–110)

## 2023-04-27 PROCEDURE — 93798 PHYS/QHP OP CAR RHAB W/ECG: CPT

## 2023-04-27 PROCEDURE — 82962 GLUCOSE BLOOD TEST: CPT

## 2023-04-27 ASSESSMENT — EXERCISE STRESS TEST
PEAK_METS: 3.1
PEAK_HR: 106
PEAK_RPE: 13

## 2023-05-01 ENCOUNTER — APPOINTMENT (OUTPATIENT)
Facility: HOSPITAL | Age: 63
End: 2023-05-01
Payer: MEDICARE

## 2023-05-01 ENCOUNTER — HOSPITAL ENCOUNTER (OUTPATIENT)
Facility: HOSPITAL | Age: 63
Setting detail: RECURRING SERIES
Discharge: HOME | End: 2023-05-04
Payer: MEDICARE

## 2023-05-01 VITALS — BODY MASS INDEX: 33.83 KG/M2 | WEIGHT: 216 LBS

## 2023-05-01 LAB — GLUCOSE BLD STRIP.AUTO-MCNC: 81 MG/DL (ref 70–110)

## 2023-05-01 PROCEDURE — 82962 GLUCOSE BLOOD TEST: CPT

## 2023-05-01 PROCEDURE — 93798 PHYS/QHP OP CAR RHAB W/ECG: CPT

## 2023-05-01 ASSESSMENT — EXERCISE STRESS TEST
PEAK_RPE: 13
PEAK_METS: 3.1
PEAK_HR: 103

## 2023-05-02 ENCOUNTER — APPOINTMENT (OUTPATIENT)
Facility: HOSPITAL | Age: 63
End: 2023-05-02
Payer: MEDICARE

## 2023-05-02 NOTE — PROGRESS NOTES
improve ability to perform household activities. Status at last note/certification: EXT 79-87% of WNL, flex 50-75% of WNL  Current: ext 50%; flex WNL partially met  4. Pt will report being able to walk several blocks with a little or no difficulty/pain to improve ability to tolerate community ambulation. Status at last note/certification limited a lot per FOTO   Current:   limited a lot             Goals/Measure of Progress: To be achieved in 4 weeks: 1. Pt will increase FOTO score to 44 points to improve ability to perform ADLs. Status at last note/certification: 35 points  PN:40  2. Pt will report an improvement in at best pain to 2/10 or better improve ability to tolerate sitting. Status at last note/certification 1/52 at best  PN :3/10  3. Pt will increase AROM l/s EXT to 50-75% of WNL, flex to % of WNL; with minimal to no increased pain to improve ability to perform household activities. Status at last note/certification: EXT 62-29% of WNL, flex 50-75% of WNL  PN: ext 50%; flex WNL partially met  4. Pt will report being able to walk several blocks with a little or no difficulty/pain to improve ability to tolerate community ambulation. Status at last note/certification limited a lot per FOTO   PN:  limited a lot               Summary of Care/ Key Functional Changes: Patient is progressing well towards goals. L/S flexion is no WNL without pain. Left hip IR is 50% less than right. Experienced left sided SI cavitation with prone manual IR associated with decreased pain post manual.  Patient will benefit from continuing with PT to further progress towards goals. ASSESSMENT/RECOMMENDATIONS:    Continue per plan of care.  2x a week x 4 weeks     Thank you for this referral.   Kun Rodrigez, PT 5/2/2023 4:09 PM
in at best pain to 2/10 or better improve ability to tolerate sitting. Status at last note/certification 0/28 at best  Current:4/10 4/24/23; 2/10 on 4/25/2023  3. Pt will increase AROM l/s EXT to 50-75% of WNL, flex to % of WNL; with minimal to no increased pain to improve ability to perform household activities. Status at last note/certification: EXT 79-74% of WNL, flex 50-75% of WNL  Current: ext 50%; flex WNL partially met 5/3/23  4. Pt will report being able to walk several blocks with a little or no difficulty/pain to improve ability to tolerate community ambulation.    Status at last note/certification limited a lot per FOTO   Current:   limited a lot             PLAN  Yes  Continue plan of care  []  Upgrade activities as tolerated  []  Discharge due to :  []  Other:    Ernesto Nina, PT, CMTPT    5/2/2023    4:08 PM    Future Appointments   Date Time Provider Annmarie Franco   5/3/2023 10:00  [facility]   5/3/2023 11:10 AM Ernesto Wood, PT Zeina Caripo   5/5/2023 10:00  [facility]   5/8/2023 10:00  [facility]   5/8/2023 11:10 AM John Matias PTA [facility]   5/10/2023 10:00  [facility]   5/12/2023 10:00  [facility]   5/15/2023 10:00  [facility]   5/17/2023 10:00  [facility]   5/19/2023 10:00  [facility]   5/22/2023 10:00 AM [facility] CARDIAC EXERCISE [facility]   5/24/2023 10:00 AM [facility] CARDIAC EXERCISE [facility]   5/26/2023 10:00 AM [facility] CARDIAC EXERCISE [facility]   5/31/2023 10:00 AM [facility] CARDIAC EXERCISE [facility]   6/2/2023 10:00 AM [facility] CARDIAC EXERCISE [facility]   6/5/2023 10:00 AM [address] [place] [address]   6/7/2023 10:00 AM [address] [place] [address]   6/9/2023 10:00 AM [address] [place] [address]   6/12/2023 10:00 AM [address] [place] [address]   6/14/2023 10:00 AM [facility] CARDIAC EXERCISE

## 2023-05-03 ENCOUNTER — HOSPITAL ENCOUNTER (OUTPATIENT)
Facility: HOSPITAL | Age: 63
Setting detail: RECURRING SERIES
Discharge: HOME | End: 2023-05-06
Payer: MEDICARE

## 2023-05-03 ENCOUNTER — APPOINTMENT (OUTPATIENT)
Facility: HOSPITAL | Age: 63
End: 2023-05-03
Payer: MEDICARE

## 2023-05-03 VITALS — WEIGHT: 216 LBS | BODY MASS INDEX: 33.83 KG/M2

## 2023-05-03 LAB — GLUCOSE BLD STRIP.AUTO-MCNC: 76 MG/DL (ref 70–110)

## 2023-05-03 PROCEDURE — 97140 MANUAL THERAPY 1/> REGIONS: CPT

## 2023-05-03 PROCEDURE — 93798 PHYS/QHP OP CAR RHAB W/ECG: CPT

## 2023-05-03 PROCEDURE — 97110 THERAPEUTIC EXERCISES: CPT

## 2023-05-03 PROCEDURE — 82962 GLUCOSE BLOOD TEST: CPT

## 2023-05-03 PROCEDURE — 97112 NEUROMUSCULAR REEDUCATION: CPT

## 2023-05-03 ASSESSMENT — EXERCISE STRESS TEST
PEAK_RPE: 13
PEAK_METS: 3.1
PEAK_HR: 110

## 2023-05-04 ENCOUNTER — APPOINTMENT (OUTPATIENT)
Facility: HOSPITAL | Age: 63
End: 2023-05-04
Payer: MEDICARE

## 2023-05-05 ENCOUNTER — APPOINTMENT (OUTPATIENT)
Facility: HOSPITAL | Age: 63
End: 2023-05-05
Payer: MEDICARE

## 2023-05-05 ENCOUNTER — HOSPITAL ENCOUNTER (OUTPATIENT)
Facility: HOSPITAL | Age: 63
Setting detail: RECURRING SERIES
Discharge: HOME | End: 2023-05-08
Payer: MEDICARE

## 2023-05-05 VITALS — WEIGHT: 216 LBS | BODY MASS INDEX: 33.83 KG/M2

## 2023-05-05 LAB — GLUCOSE BLD STRIP.AUTO-MCNC: 109 MG/DL (ref 70–110)

## 2023-05-05 PROCEDURE — 93798 PHYS/QHP OP CAR RHAB W/ECG: CPT

## 2023-05-05 PROCEDURE — 82962 GLUCOSE BLOOD TEST: CPT

## 2023-05-05 ASSESSMENT — EXERCISE STRESS TEST
PEAK_RPE: 13
PEAK_HR: 111
PEAK_METS: 3.1

## 2023-05-08 ENCOUNTER — HOSPITAL ENCOUNTER (OUTPATIENT)
Facility: HOSPITAL | Age: 63
Setting detail: RECURRING SERIES
Discharge: HOME | End: 2023-05-11
Payer: MEDICARE

## 2023-05-08 ENCOUNTER — APPOINTMENT (OUTPATIENT)
Facility: HOSPITAL | Age: 63
End: 2023-05-08
Payer: MEDICARE

## 2023-05-08 VITALS — BODY MASS INDEX: 33.83 KG/M2 | WEIGHT: 216 LBS

## 2023-05-08 LAB — GLUCOSE BLD STRIP.AUTO-MCNC: 92 MG/DL (ref 70–110)

## 2023-05-08 PROCEDURE — 97112 NEUROMUSCULAR REEDUCATION: CPT

## 2023-05-08 PROCEDURE — 82962 GLUCOSE BLOOD TEST: CPT

## 2023-05-08 PROCEDURE — 93798 PHYS/QHP OP CAR RHAB W/ECG: CPT

## 2023-05-08 PROCEDURE — 97140 MANUAL THERAPY 1/> REGIONS: CPT

## 2023-05-08 PROCEDURE — 97110 THERAPEUTIC EXERCISES: CPT

## 2023-05-08 ASSESSMENT — EXERCISE STRESS TEST
PEAK_RPE: 13
PEAK_METS: 3.1
PEAK_HR: 114

## 2023-05-08 NOTE — PROGRESS NOTES
PHYSICAL / OCCUPATIONAL THERAPY - DAILY TREATMENT NOTE (updated )    Patient Name: China García    Date: 2023    : 1960  Insurance: Payor: Celeste Esparza / Plan: Nedra Tyson / Product Type: *No Product type* /      Patient  verified Yes     Visit #   Current / Total 9 18   Time   In / Out 1110 1200   Pain   In / Out 6 4   Subjective Functional Status/Changes: Pt reports no changes since last visit. Changes to:  Meds, Allergies, Med Hx, Sx Hx? If yes, update Summary List no       TREATMENT AREA =  Lumbar pain [M54.50]  Cervical pain [M54.2]  Myofascial pain [M79.18]  Musculoskeletal pain [M79.18]  Physical deconditioning [R53.81]  Numbness of left lower extremity [R20.0]  Upper back pain [M54.9]  Bilateral shoulder pain, unspecified chronicity [M25.511, M25.512]    OBJECTIVE      Therapeutic Procedures: Tx Min Billable or 1:1 Min (if diff from Tx Min) Procedure, Rationale, Specifics   32 27 83900 Therapeutic Exercise (timed):  increase ROM, strength, coordination, balance, and proprioception to improve patient's ability to progress to PLOF and address remaining functional goals. (see flow sheet as applicable)     Details if applicable:       10 10 89159 Neuromuscular Re-Education (timed):  improve balance, coordination, kinesthetic sense, posture, core stability and proprioception to improve patient's ability to develop conscious control of individual muscles and awareness of position of extremities in order to progress to PLOF and address remaining functional goals. (see flow sheet as applicable)     Details if applicable:     8 8 28159 Manual Therapy (timed):  decrease pain, increase ROM, and increase tissue extensibility to improve patient's ability to progress to PLOF and address remaining functional goals. The manual therapy interventions were performed at a separate and distinct time from the therapeutic activities interventions .  (see flow sheet as applicable)

## 2023-05-09 ENCOUNTER — APPOINTMENT (OUTPATIENT)
Facility: HOSPITAL | Age: 63
End: 2023-05-09
Payer: MEDICARE

## 2023-05-10 ENCOUNTER — APPOINTMENT (OUTPATIENT)
Facility: HOSPITAL | Age: 63
End: 2023-05-10
Payer: MEDICARE

## 2023-05-10 ENCOUNTER — HOSPITAL ENCOUNTER (OUTPATIENT)
Facility: HOSPITAL | Age: 63
Setting detail: RECURRING SERIES
Discharge: HOME | End: 2023-05-13
Payer: MEDICARE

## 2023-05-10 VITALS — BODY MASS INDEX: 33.83 KG/M2 | WEIGHT: 216 LBS

## 2023-05-10 PROCEDURE — 93798 PHYS/QHP OP CAR RHAB W/ECG: CPT

## 2023-05-10 ASSESSMENT — PATIENT HEALTH QUESTIONNAIRE - PHQ9
5. POOR APPETITE OR OVEREATING: 1
SUM OF ALL RESPONSES TO PHQ QUESTIONS 1-9: 3
10. IF YOU CHECKED OFF ANY PROBLEMS, HOW DIFFICULT HAVE THESE PROBLEMS MADE IT FOR YOU TO DO YOUR WORK, TAKE CARE OF THINGS AT HOME, OR GET ALONG WITH OTHER PEOPLE: 0
9. THOUGHTS THAT YOU WOULD BE BETTER OFF DEAD, OR OF HURTING YOURSELF: 0
7. TROUBLE CONCENTRATING ON THINGS, SUCH AS READING THE NEWSPAPER OR WATCHING TELEVISION: 0
8. MOVING OR SPEAKING SO SLOWLY THAT OTHER PEOPLE COULD HAVE NOTICED. OR THE OPPOSITE, BEING SO FIGETY OR RESTLESS THAT YOU HAVE BEEN MOVING AROUND A LOT MORE THAN USUAL: 0
2. FEELING DOWN, DEPRESSED OR HOPELESS: 0
3. TROUBLE FALLING OR STAYING ASLEEP: 1
SUM OF ALL RESPONSES TO PHQ QUESTIONS 1-9: 3
6. FEELING BAD ABOUT YOURSELF - OR THAT YOU ARE A FAILURE OR HAVE LET YOURSELF OR YOUR FAMILY DOWN: 0
SUM OF ALL RESPONSES TO PHQ QUESTIONS 1-9: 3
1. LITTLE INTEREST OR PLEASURE IN DOING THINGS: 0
SUM OF ALL RESPONSES TO PHQ QUESTIONS 1-9: 3
4. FEELING TIRED OR HAVING LITTLE ENERGY: 1
SUM OF ALL RESPONSES TO PHQ9 QUESTIONS 1 & 2: 0

## 2023-05-10 ASSESSMENT — EXERCISE STRESS TEST
PEAK_RPE: 13
PEAK_METS: 3.1
PEAK_HR: 111

## 2023-05-11 ENCOUNTER — APPOINTMENT (OUTPATIENT)
Facility: HOSPITAL | Age: 63
End: 2023-05-11
Payer: MEDICARE

## 2023-05-11 ASSESSMENT — LIFESTYLE VARIABLES: SMOKELESS_TOBACCO: NO

## 2023-05-11 ASSESSMENT — EXERCISE STRESS TEST: PEAK_BP: 118/64

## 2023-05-11 ASSESSMENT — EJECTION FRACTION: EF_VALUE: 65

## 2023-05-11 NOTE — PROGRESS NOTES
CARDIAC REHAB ITP REASSESSMENT FOR REVIEW AND SIGNATURE  Patient name: Abram Kelly : 1960     Visits from Start of Care:                                    Reporting Period: 2023 - 2023     Subjective Reports: Progressing well despite some physical limitations. Goals Comments   1. Encourage healthy weight loss of 3-5 lbs by next recert period   [] met                  [] not met  [x] progressing Pt has a weight gain of 1 lb since start of program. Pt is encouraged to eat a heart healthy diet consisting of whole foods and minimal processed foods. 2. Maintain improved blood pressure (BP) control by next recert period     [] met                  [] not met  [x] progressing Pt BP is HTN on occasion. Consistent exercise, medication timing, sodium reduction and BP monitoring can assist in managing. 3. Eat a heart healthy diet by end of program   [] met                  [] not met  [x] progressing Pt is educated to make \"doable\" diet modifications and limit highly-processed foods for special occasions. Pt is encouraged to eat a Mediterranean-influenced diet, one that  includes lots of healthy foods like whole grains, fruits, vegetables, seafood, beans, and nuts. 4. Maintain target heart rate (THR) during exercise by end of program    [] met                  [] not met  [x] progressing Pt will be able to exercise and maintain THR between  during each session to maximize cardiac benefits. Key functional changes: Pt is able to complete 45 minutes of exercise     Problems/ barriers to goal attainment: Pt is currently having PT for spine. Assessment / Recommendations: Continue with rehab 2 times a week.       Cardiac ITP    Treatment Diagnosis  Treatment Diagnosis 1: Valve (TAVR)  Referral Date: 23  Significant Cardiovascular History:  (TAVR, HTN, DLD, DM2)         Oxygen Intervention  Oxygen Use: No  O2 Sat Greater Than 90%: Yes    Individual Treatment Plan  ITP

## 2023-05-12 ENCOUNTER — APPOINTMENT (OUTPATIENT)
Facility: HOSPITAL | Age: 63
End: 2023-05-12
Payer: MEDICARE

## 2023-05-15 ENCOUNTER — HOSPITAL ENCOUNTER (OUTPATIENT)
Facility: HOSPITAL | Age: 63
Setting detail: RECURRING SERIES
Discharge: HOME | End: 2023-05-18
Payer: MEDICARE

## 2023-05-15 ENCOUNTER — APPOINTMENT (OUTPATIENT)
Facility: HOSPITAL | Age: 63
End: 2023-05-15
Payer: MEDICARE

## 2023-05-15 VITALS — WEIGHT: 214 LBS | BODY MASS INDEX: 33.52 KG/M2

## 2023-05-15 PROCEDURE — 93798 PHYS/QHP OP CAR RHAB W/ECG: CPT

## 2023-05-15 ASSESSMENT — EXERCISE STRESS TEST
PEAK_HR: 118
PEAK_RPE: 14
PEAK_METS: 3.1

## 2023-05-16 ENCOUNTER — APPOINTMENT (OUTPATIENT)
Facility: HOSPITAL | Age: 63
End: 2023-05-16
Payer: MEDICARE

## 2023-05-17 ENCOUNTER — HOSPITAL ENCOUNTER (OUTPATIENT)
Facility: HOSPITAL | Age: 63
Setting detail: RECURRING SERIES
Discharge: HOME | End: 2023-05-20
Payer: MEDICARE

## 2023-05-17 ENCOUNTER — APPOINTMENT (OUTPATIENT)
Facility: HOSPITAL | Age: 63
End: 2023-05-17
Payer: MEDICARE

## 2023-05-17 VITALS — WEIGHT: 214 LBS | BODY MASS INDEX: 33.52 KG/M2

## 2023-05-17 PROCEDURE — 97112 NEUROMUSCULAR REEDUCATION: CPT

## 2023-05-17 PROCEDURE — 93798 PHYS/QHP OP CAR RHAB W/ECG: CPT

## 2023-05-17 PROCEDURE — 97110 THERAPEUTIC EXERCISES: CPT

## 2023-05-17 PROCEDURE — 97140 MANUAL THERAPY 1/> REGIONS: CPT

## 2023-05-17 ASSESSMENT — EXERCISE STRESS TEST
PEAK_RPE: 13
PEAK_HR: 116
PEAK_METS: 3.1

## 2023-05-17 NOTE — PROGRESS NOTES
PHYSICAL / OCCUPATIONAL THERAPY - DAILY TREATMENT NOTE (updated )    Patient Name: Annamaria Chika    Date: 2023    : 1960  Insurance: Payor: Jose Lucas / Plan: JANET GONZALEZ [facility] / Product Type: *No Product type* /      Patient  verified Yes     Visit #   Current / Total 10 18   Time   In / Out 12:57 1:47   Pain   In / Out 3 3   Subjective Functional Status/Changes: Pt states she has already done cardiac rehab this morning   Changes to:  Meds, Allergies, Med Hx, Sx Hx? If yes, update Summary List no       TREATMENT AREA =  Lumbar pain [M54.50]  Cervical pain [M54.2]  Myofascial pain [M79.18]  Musculoskeletal pain [M79.18]  Physical deconditioning [R53.81]  Numbness of left lower extremity [R20.0]  Upper back pain [M54.9]  Bilateral shoulder pain, unspecified chronicity [M25.511, M25.512]    OBJECTIVE    Therapeutic Procedures: Tx Min Billable or 1:1 Min (if diff from Tx Min) Procedure, Rationale, Specifics   27  21060 Therapeutic Exercise (timed):  increase ROM, strength, coordination, balance, and proprioception to improve patient's ability to progress to PLOF and address remaining functional goals. (see flow sheet as applicable)     Details if applicable:       15  78465 Neuromuscular Re-Education (timed):  improve balance, coordination, kinesthetic sense, posture, core stability and proprioception to improve patient's ability to develop conscious control of individual muscles and awareness of position of extremities in order to progress to PLOF and address remaining functional goals. (see flow sheet as applicable)     Details if applicable:     8  33589 Manual Therapy (timed):  decrease pain, increase ROM, and increase tissue extensibility to improve patient's ability to progress to PLOF and address remaining functional goals. The manual therapy interventions were performed at a separate and distinct time from the therapeutic activities interventions .  (see flow sheet as applicable)     Details if

## 2023-05-18 ENCOUNTER — APPOINTMENT (OUTPATIENT)
Facility: HOSPITAL | Age: 63
End: 2023-05-18
Payer: MEDICARE

## 2023-05-19 ENCOUNTER — APPOINTMENT (OUTPATIENT)
Facility: HOSPITAL | Age: 63
End: 2023-05-19
Payer: MEDICARE

## 2023-05-22 ENCOUNTER — HOSPITAL ENCOUNTER (OUTPATIENT)
Facility: HOSPITAL | Age: 63
Setting detail: RECURRING SERIES
End: 2023-05-22
Payer: MEDICARE

## 2023-05-22 ENCOUNTER — APPOINTMENT (OUTPATIENT)
Facility: HOSPITAL | Age: 63
End: 2023-05-22
Payer: MEDICARE

## 2023-05-22 ENCOUNTER — HOSPITAL ENCOUNTER (OUTPATIENT)
Facility: HOSPITAL | Age: 63
Setting detail: RECURRING SERIES
Discharge: HOME | End: 2023-05-25
Payer: MEDICARE

## 2023-05-22 VITALS — BODY MASS INDEX: 33.52 KG/M2 | WEIGHT: 214 LBS

## 2023-05-22 PROCEDURE — 93798 PHYS/QHP OP CAR RHAB W/ECG: CPT

## 2023-05-22 ASSESSMENT — EXERCISE STRESS TEST
PEAK_RPE: 13
PEAK_HR: 104
PEAK_METS: 3.1

## 2023-05-23 ENCOUNTER — APPOINTMENT (OUTPATIENT)
Facility: HOSPITAL | Age: 63
End: 2023-05-23
Payer: MEDICARE

## 2023-05-24 ENCOUNTER — APPOINTMENT (OUTPATIENT)
Facility: HOSPITAL | Age: 63
End: 2023-05-24
Payer: MEDICARE

## 2023-05-24 ENCOUNTER — HOSPITAL ENCOUNTER (OUTPATIENT)
Facility: HOSPITAL | Age: 63
Setting detail: RECURRING SERIES
Discharge: HOME | End: 2023-05-27
Payer: MEDICARE

## 2023-05-24 VITALS — BODY MASS INDEX: 33.2 KG/M2 | WEIGHT: 212 LBS

## 2023-05-24 PROCEDURE — 97140 MANUAL THERAPY 1/> REGIONS: CPT

## 2023-05-24 PROCEDURE — 97112 NEUROMUSCULAR REEDUCATION: CPT

## 2023-05-24 PROCEDURE — 97110 THERAPEUTIC EXERCISES: CPT

## 2023-05-24 PROCEDURE — 93798 PHYS/QHP OP CAR RHAB W/ECG: CPT

## 2023-05-24 ASSESSMENT — EXERCISE STRESS TEST
PEAK_HR: 112
PEAK_RPE: 13
PEAK_METS: 3.1

## 2023-05-24 NOTE — PROGRESS NOTES
25-50% of WNL, flex 50-75% of WNL  PN: ext 50%; flex WNL partially met  4. Pt will report being able to walk several blocks with a little or no difficulty/pain to improve ability to tolerate community ambulation.    Status at last note/certification limited a lot per FOTO   PN:  limited a lot                  PLAN  Yes  Continue plan of care  [x]  Upgrade activities as tolerated  []  Discharge due to :  []  Other:    Dave Bolden PTA    5/24/2023    11:39 AM    Future Appointments   Date Time Provider Annmarie Franco   [facility]   5/26/2023  9:00  [facility]   5/30/2023  9:10 AM [facility]   5/31/2023  9:00  [facility]   6/1/2023  9:10 AM DYLLAN Crystal [facility]   6/2/2023  9:00  [facility]   6/5/2023  9:00  [facility]   6/5/2023 12:30 PM DYLLAN Crystal [facility]   6/7/2023  9:00  [facility]   6/7/2023 11:50 AM Nicholas Silva PT [facility]   6/9/2023  9:00  [facility]   6/12/2023  9:00  [facility]   6/14/2023  9:00 AM [facility] CARDIAC EXERCISE [facility]   6/16/2023  9:00 AM [facility] CARDIAC EXERCISE [facility]   6/19/2023  9:00 AM [facility] CARDIAC EXERCISE [facility]   6/21/2023  9:00 AM [facility] CARDIAC EXERCISE [facility]   6/23/2023  9:00 AM [facility] CARDIAC EXERCISE [facility]   6/26/2023  9:00 AM [facility] CARDIAC EXERCISE [facility]   6/28/2023  9:00 AM [facility] CARDIAC EXERCISE [facility]   6/30/2023  9:00 AM [facility] CARDIAC EXERCISE [facility]   7/3/2023  9:00 AM [facility] CARDIAC EXERCISE [facility]   7/5/2023  9:00 AM [facility] CARDIAC EXERCISE [facility]   7/7/2023  9:00 AM [facility] CARDIAC EXERCISE [facility]   7/10/2023 10:15 AM Shena Finn MD [facility]

## 2023-05-25 ENCOUNTER — APPOINTMENT (OUTPATIENT)
Facility: HOSPITAL | Age: 63
End: 2023-05-25
Payer: MEDICARE

## 2023-05-26 ENCOUNTER — APPOINTMENT (OUTPATIENT)
Facility: HOSPITAL | Age: 63
End: 2023-05-26
Payer: MEDICARE

## 2023-05-30 ENCOUNTER — APPOINTMENT (OUTPATIENT)
Facility: HOSPITAL | Age: 63
End: 2023-05-30
Payer: MEDICARE

## 2023-05-31 ENCOUNTER — APPOINTMENT (OUTPATIENT)
Facility: HOSPITAL | Age: 63
End: 2023-05-31
Payer: MEDICARE

## 2023-06-01 ENCOUNTER — APPOINTMENT (OUTPATIENT)
Facility: HOSPITAL | Age: 63
End: 2023-06-01
Payer: MEDICARE

## 2023-06-02 ENCOUNTER — APPOINTMENT (OUTPATIENT)
Facility: HOSPITAL | Age: 63
End: 2023-06-02
Payer: MEDICARE

## 2023-06-05 ENCOUNTER — TELEPHONE (OUTPATIENT)
Facility: HOSPITAL | Age: 63
End: 2023-06-05

## 2023-06-05 ENCOUNTER — HOSPITAL ENCOUNTER (OUTPATIENT)
Facility: HOSPITAL | Age: 63
Setting detail: RECURRING SERIES
Discharge: HOME | End: 2023-06-08
Payer: MEDICARE

## 2023-06-05 VITALS — WEIGHT: 215 LBS | BODY MASS INDEX: 33.67 KG/M2

## 2023-06-05 PROCEDURE — 93798 PHYS/QHP OP CAR RHAB W/ECG: CPT

## 2023-06-05 ASSESSMENT — EXERCISE STRESS TEST
PEAK_RPE: 13
PEAK_METS: 3.1
PEAK_HR: 109

## 2023-06-06 ENCOUNTER — HOSPITAL ENCOUNTER (OUTPATIENT)
Facility: HOSPITAL | Age: 63
Setting detail: RECURRING SERIES
Discharge: HOME | End: 2023-06-09
Payer: MEDICARE

## 2023-06-06 VITALS — BODY MASS INDEX: 33.67 KG/M2 | WEIGHT: 215 LBS

## 2023-06-06 PROCEDURE — 93798 PHYS/QHP OP CAR RHAB W/ECG: CPT

## 2023-06-06 ASSESSMENT — EXERCISE STRESS TEST
PEAK_HR: 126
PEAK_RPE: 14
PEAK_METS: 3.1

## 2023-06-07 ENCOUNTER — APPOINTMENT (OUTPATIENT)
Facility: HOSPITAL | Age: 63
End: 2023-06-07
Payer: MEDICARE

## 2023-06-14 ENCOUNTER — APPOINTMENT (OUTPATIENT)
Facility: HOSPITAL | Age: 63
End: 2023-06-14
Payer: MEDICARE

## 2023-06-16 ENCOUNTER — APPOINTMENT (OUTPATIENT)
Facility: HOSPITAL | Age: 63
End: 2023-06-16
Payer: MEDICARE

## 2023-06-19 ENCOUNTER — APPOINTMENT (OUTPATIENT)
Facility: HOSPITAL | Age: 63
End: 2023-06-19
Payer: MEDICARE

## 2023-06-21 ENCOUNTER — APPOINTMENT (OUTPATIENT)
Facility: HOSPITAL | Age: 63
End: 2023-06-21
Payer: MEDICARE

## 2023-06-23 ENCOUNTER — APPOINTMENT (OUTPATIENT)
Facility: HOSPITAL | Age: 63
End: 2023-06-23
Payer: MEDICARE

## 2023-06-26 ENCOUNTER — APPOINTMENT (OUTPATIENT)
Facility: HOSPITAL | Age: 63
End: 2023-06-26
Payer: MEDICARE

## 2023-06-28 ENCOUNTER — APPOINTMENT (OUTPATIENT)
Facility: HOSPITAL | Age: 63
End: 2023-06-28
Payer: MEDICARE

## 2023-06-30 ENCOUNTER — APPOINTMENT (OUTPATIENT)
Facility: HOSPITAL | Age: 63
End: 2023-06-30
Payer: MEDICARE

## 2023-07-03 ENCOUNTER — APPOINTMENT (OUTPATIENT)
Facility: HOSPITAL | Age: 63
End: 2023-07-03
Payer: MEDICARE

## 2023-07-05 ENCOUNTER — APPOINTMENT (OUTPATIENT)
Facility: HOSPITAL | Age: 63
End: 2023-07-05
Payer: MEDICARE

## 2023-07-07 ENCOUNTER — APPOINTMENT (OUTPATIENT)
Facility: HOSPITAL | Age: 63
End: 2023-07-07
Payer: MEDICARE

## 2023-07-10 ENCOUNTER — APPOINTMENT (OUTPATIENT)
Facility: HOSPITAL | Age: 63
End: 2023-07-10
Payer: MEDICARE

## 2023-07-12 ENCOUNTER — APPOINTMENT (OUTPATIENT)
Facility: HOSPITAL | Age: 63
End: 2023-07-12
Payer: MEDICARE

## 2023-07-14 ENCOUNTER — APPOINTMENT (OUTPATIENT)
Facility: HOSPITAL | Age: 63
End: 2023-07-14
Payer: MEDICARE

## 2023-07-17 ENCOUNTER — APPOINTMENT (OUTPATIENT)
Facility: HOSPITAL | Age: 63
End: 2023-07-17
Payer: MEDICARE

## 2023-07-19 ENCOUNTER — APPOINTMENT (OUTPATIENT)
Facility: HOSPITAL | Age: 63
End: 2023-07-19
Payer: MEDICARE

## 2023-07-21 ENCOUNTER — APPOINTMENT (OUTPATIENT)
Facility: HOSPITAL | Age: 63
End: 2023-07-21
Payer: MEDICARE

## 2023-07-24 ENCOUNTER — APPOINTMENT (OUTPATIENT)
Facility: HOSPITAL | Age: 63
End: 2023-07-24
Payer: MEDICARE

## 2023-07-24 ENCOUNTER — HOSPITAL ENCOUNTER (OUTPATIENT)
Facility: HOSPITAL | Age: 63
Setting detail: RECURRING SERIES
Discharge: HOME | End: 2023-07-27
Payer: MEDICARE

## 2023-07-24 VITALS — BODY MASS INDEX: 33.05 KG/M2 | WEIGHT: 211 LBS

## 2023-07-24 PROCEDURE — 93798 PHYS/QHP OP CAR RHAB W/ECG: CPT

## 2023-07-24 ASSESSMENT — EXERCISE STRESS TEST
PEAK_METS: 4.1
PEAK_RPE: 13
PEAK_HR: 118

## 2023-07-26 ENCOUNTER — APPOINTMENT (OUTPATIENT)
Facility: HOSPITAL | Age: 63
End: 2023-07-26
Payer: MEDICARE

## 2023-07-28 ENCOUNTER — HOSPITAL ENCOUNTER (OUTPATIENT)
Facility: HOSPITAL | Age: 63
Setting detail: RECURRING SERIES
Discharge: HOME | End: 2023-07-31
Payer: MEDICARE

## 2023-07-28 ENCOUNTER — APPOINTMENT (OUTPATIENT)
Facility: HOSPITAL | Age: 63
End: 2023-07-28
Payer: MEDICARE

## 2023-07-28 VITALS — BODY MASS INDEX: 33.05 KG/M2 | WEIGHT: 211 LBS

## 2023-07-28 LAB — GLUCOSE BLD STRIP.AUTO-MCNC: 114 MG/DL (ref 70–110)

## 2023-07-28 PROCEDURE — 82962 GLUCOSE BLOOD TEST: CPT

## 2023-07-28 PROCEDURE — 93798 PHYS/QHP OP CAR RHAB W/ECG: CPT

## 2023-07-28 ASSESSMENT — EXERCISE STRESS TEST
PEAK_HR: 104
PEAK_METS: 4.7
PEAK_RPE: 14

## 2023-07-31 ENCOUNTER — HOSPITAL ENCOUNTER (OUTPATIENT)
Facility: HOSPITAL | Age: 63
Setting detail: RECURRING SERIES
Discharge: HOME | End: 2023-08-03
Payer: MEDICARE

## 2023-07-31 ENCOUNTER — APPOINTMENT (OUTPATIENT)
Facility: HOSPITAL | Age: 63
End: 2023-07-31
Payer: MEDICARE

## 2023-07-31 VITALS — BODY MASS INDEX: 33.83 KG/M2 | WEIGHT: 216 LBS

## 2023-07-31 LAB — GLUCOSE BLD STRIP.AUTO-MCNC: 78 MG/DL (ref 70–110)

## 2023-07-31 PROCEDURE — 82962 GLUCOSE BLOOD TEST: CPT

## 2023-07-31 PROCEDURE — 93798 PHYS/QHP OP CAR RHAB W/ECG: CPT

## 2023-07-31 ASSESSMENT — EXERCISE STRESS TEST
PEAK_RPE: 13
PEAK_HR: 93
PEAK_METS: 4.7

## 2023-08-02 ENCOUNTER — HOSPITAL ENCOUNTER (OUTPATIENT)
Facility: HOSPITAL | Age: 63
Setting detail: RECURRING SERIES
Discharge: HOME | End: 2023-08-05
Payer: MEDICARE

## 2023-08-02 ENCOUNTER — APPOINTMENT (OUTPATIENT)
Facility: HOSPITAL | Age: 63
End: 2023-08-02
Payer: MEDICARE

## 2023-08-02 VITALS — WEIGHT: 213 LBS | BODY MASS INDEX: 33.36 KG/M2

## 2023-08-02 PROCEDURE — 93798 PHYS/QHP OP CAR RHAB W/ECG: CPT

## 2023-08-02 ASSESSMENT — EXERCISE STRESS TEST
PEAK_HR: 96
PEAK_RPE: 15
PEAK_METS: 4.1

## 2023-08-03 ASSESSMENT — EJECTION FRACTION: EF_VALUE: 65

## 2023-08-03 ASSESSMENT — LIFESTYLE VARIABLES: SMOKELESS_TOBACCO: NO

## 2023-08-03 ASSESSMENT — EXERCISE STRESS TEST: PEAK_BP: 132/67

## 2023-08-03 NOTE — PROGRESS NOTES
with no complaints. Problems/ barriers to goal attainment: None noted during this visit     Assessment / Recommendations: Continue with rehab 2X times a week. Cardiac ITP    Treatment Diagnosis  Treatment Diagnosis 1: Valve (TAVR)  Referral Date: 03/23/23  Significant Cardiovascular History:  (TAVR, HTN, DLD, DM2)         Oxygen Intervention  Oxygen Use: No  O2 Sat Greater Than 90%: Yes    Individual Treatment Plan  ITP Visit Type: Re-assessment  1st Date of Exercise : 04/12/23  ITP Next Review Date: 08/31/23  Visit #/Total Visits: 21/36  EF%: 65 %  Risk Stratification: High  ITP: Exercise, Psychosocial, Tobacco, Nutrition, Education   Exercise   Stages of Change: Action  Assisted Devices: None         Exercise Prescription  Mode: Treadmill, Bike, Stepper, Ergometer, Rower, Other (comment)  Frequency per week: 2-3  Duration Per Session: 31-55  Intensity METS      : 2.45-4.45  RPE: 12-16  Progression: Progressing  Target Heart Rate:   Resistance Training: No   Exercise Blood Pressures  Resting BP: 145/85  Peak BP: 132/67  Is BP WDL: No    Exercise Activity at Home  Type: spinal rehab  Frequency: 2-3  Duration: 45-1 hr  Resistance Training: No    Exercise Education  Education: Self pulse, Exercise safety, Signs/symptoms to report, RPE scale, Equipment orientation, Warm up/cool down, Physically active    Exercise Target Goal  Target Goal(s):  Individual exercise RX, Aerobic activity 30 + minutes/day  5 days/week, BP < 140/90 or < 130/80, if DM or CKD  Patient Stated Exercise Goals: Ready to go back to living and doing things, it's going slow    Psychosocial  Stages of Change: Action    Psychosocial Intervention  Interventions: No intervention indicated  Currently Taking Psychotropic Meds: Yes  Medication Changes: No    Psychosocial Education  Education: Advanced directives, Benefits of CPR completion, Cardiac meds, Coping techniques, Environmental triggers, Impact self care behaviors on health, Relaxation

## 2023-08-04 ENCOUNTER — HOSPITAL ENCOUNTER (OUTPATIENT)
Facility: HOSPITAL | Age: 63
Setting detail: RECURRING SERIES
Discharge: HOME | End: 2023-08-07
Payer: MEDICARE

## 2023-08-04 ENCOUNTER — APPOINTMENT (OUTPATIENT)
Facility: HOSPITAL | Age: 63
End: 2023-08-04
Payer: MEDICARE

## 2023-08-04 VITALS — WEIGHT: 212 LBS | BODY MASS INDEX: 33.2 KG/M2

## 2023-08-04 PROCEDURE — 93798 PHYS/QHP OP CAR RHAB W/ECG: CPT

## 2023-08-04 ASSESSMENT — PATIENT HEALTH QUESTIONNAIRE - PHQ9
6. FEELING BAD ABOUT YOURSELF - OR THAT YOU ARE A FAILURE OR HAVE LET YOURSELF OR YOUR FAMILY DOWN: 0
8. MOVING OR SPEAKING SO SLOWLY THAT OTHER PEOPLE COULD HAVE NOTICED. OR THE OPPOSITE, BEING SO FIGETY OR RESTLESS THAT YOU HAVE BEEN MOVING AROUND A LOT MORE THAN USUAL: 0
SUM OF ALL RESPONSES TO PHQ QUESTIONS 1-9: 2
1. LITTLE INTEREST OR PLEASURE IN DOING THINGS: 0
3. TROUBLE FALLING OR STAYING ASLEEP: 1
7. TROUBLE CONCENTRATING ON THINGS, SUCH AS READING THE NEWSPAPER OR WATCHING TELEVISION: 0
SUM OF ALL RESPONSES TO PHQ QUESTIONS 1-9: 2
SUM OF ALL RESPONSES TO PHQ9 QUESTIONS 1 & 2: 0
10. IF YOU CHECKED OFF ANY PROBLEMS, HOW DIFFICULT HAVE THESE PROBLEMS MADE IT FOR YOU TO DO YOUR WORK, TAKE CARE OF THINGS AT HOME, OR GET ALONG WITH OTHER PEOPLE: 0
9. THOUGHTS THAT YOU WOULD BE BETTER OFF DEAD, OR OF HURTING YOURSELF: 0
5. POOR APPETITE OR OVEREATING: 0
4. FEELING TIRED OR HAVING LITTLE ENERGY: 1
SUM OF ALL RESPONSES TO PHQ QUESTIONS 1-9: 2
2. FEELING DOWN, DEPRESSED OR HOPELESS: 0
SUM OF ALL RESPONSES TO PHQ QUESTIONS 1-9: 2

## 2023-08-04 ASSESSMENT — EXERCISE STRESS TEST
PEAK_METS: 4.1
PEAK_HR: 97
PEAK_RPE: 13

## 2023-08-07 ENCOUNTER — HOSPITAL ENCOUNTER (OUTPATIENT)
Facility: HOSPITAL | Age: 63
Setting detail: RECURRING SERIES
Discharge: HOME | End: 2023-08-10
Payer: MEDICARE

## 2023-08-07 ENCOUNTER — APPOINTMENT (OUTPATIENT)
Facility: HOSPITAL | Age: 63
End: 2023-08-07
Payer: MEDICARE

## 2023-08-07 VITALS — BODY MASS INDEX: 33.2 KG/M2 | WEIGHT: 212 LBS

## 2023-08-07 PROCEDURE — 93798 PHYS/QHP OP CAR RHAB W/ECG: CPT

## 2023-08-07 ASSESSMENT — EXERCISE STRESS TEST
PEAK_METS: 4.1
PEAK_HR: 101
PEAK_RPE: 13

## 2023-08-09 ENCOUNTER — APPOINTMENT (OUTPATIENT)
Facility: HOSPITAL | Age: 63
End: 2023-08-09
Payer: MEDICARE

## 2023-08-11 ENCOUNTER — APPOINTMENT (OUTPATIENT)
Facility: HOSPITAL | Age: 63
End: 2023-08-11
Payer: MEDICARE

## 2023-08-12 ENCOUNTER — APPOINTMENT (OUTPATIENT)
Facility: HOSPITAL | Age: 63
End: 2023-08-12
Payer: COMMERCIAL

## 2023-08-12 ENCOUNTER — HOSPITAL ENCOUNTER (EMERGENCY)
Facility: HOSPITAL | Age: 63
Discharge: HOME | End: 2023-08-12
Attending: EMERGENCY MEDICINE | Admitting: EMERGENCY MEDICINE
Payer: COMMERCIAL

## 2023-08-12 VITALS
HEART RATE: 89 BPM | SYSTOLIC BLOOD PRESSURE: 134 MMHG | TEMPERATURE: 99.7 F | RESPIRATION RATE: 16 BRPM | DIASTOLIC BLOOD PRESSURE: 57 MMHG | HEIGHT: 67 IN | OXYGEN SATURATION: 98 % | BODY MASS INDEX: 32.96 KG/M2 | WEIGHT: 210 LBS

## 2023-08-12 DIAGNOSIS — U07.1 COVID-19: Primary | ICD-10-CM

## 2023-08-12 LAB
DEPRECATED S PYO AG THROAT QL EIA: NEGATIVE
SARS-COV-2 RDRP RESP QL NAA+PROBE: DETECTED
SOURCE: ABNORMAL

## 2023-08-12 PROCEDURE — 87880 STREP A ASSAY W/OPTIC: CPT

## 2023-08-12 PROCEDURE — 87635 SARS-COV-2 COVID-19 AMP PRB: CPT

## 2023-08-12 PROCEDURE — 99284 EMERGENCY DEPT VISIT MOD MDM: CPT

## 2023-08-12 PROCEDURE — 71045 X-RAY EXAM CHEST 1 VIEW: CPT

## 2023-08-12 PROCEDURE — 87070 CULTURE OTHR SPECIMN AEROBIC: CPT

## 2023-08-12 ASSESSMENT — PAIN - FUNCTIONAL ASSESSMENT: PAIN_FUNCTIONAL_ASSESSMENT: 0-10

## 2023-08-12 ASSESSMENT — PAIN DESCRIPTION - ORIENTATION: ORIENTATION: RIGHT

## 2023-08-12 ASSESSMENT — LIFESTYLE VARIABLES
HOW MANY STANDARD DRINKS CONTAINING ALCOHOL DO YOU HAVE ON A TYPICAL DAY: PATIENT DOES NOT DRINK
HOW OFTEN DO YOU HAVE A DRINK CONTAINING ALCOHOL: NEVER

## 2023-08-12 ASSESSMENT — PAIN SCALES - GENERAL: PAINLEVEL_OUTOF10: 9

## 2023-08-12 NOTE — ED NOTES
Rn went over all discharge instructions. Patient educated on quarantine and medications. Verbalized understandings.       Luisa Kern RN  08/12/23 1178

## 2023-08-12 NOTE — ED PROVIDER NOTES
EMERGENCY DEPARTMENT HISTORY AND PHYSICAL EXAM      Patient Name: Garth Diaz  MRN: [id number]  YOB: 1960  Provider: Yves Zhao MD  PCP: Inna Sheldon MD   Time/Date of evaluation: 12:21 PM EDT on 8/12/23    History of Presenting Illness     Chief Complaint   Patient presents with    Cough    URI    Headache    Otalgia       History Provided By: {History Source:66670::\"Patient\"}     History (Narative): Garth Diaz is a 70 y.o. female {PMHx:95298::\"with a PMHX of ***\"} who presents to the emergency department  {Arrival WIH:78175} C/O *** onset ***. ***        Past History     Past Medical History:  Past Medical History:   Diagnosis Date    Acid reflux     Aortic valve insufficiency     Bipolar 1 disorder (HCC)     Depression     Hypertension     Pneumonia     Renal cell carcinoma (HCC)        Past Surgical History:  Past Surgical History:   Procedure Laterality Date    KIDNEY REMOVAL         Family History:  No family history on file. Social History:  Social History     Tobacco Use    Smoking status: Never    Smokeless tobacco: Never   Vaping Use    Vaping Use: Never used   Substance Use Topics    Alcohol use: Not Currently    Drug use: Never       Medications:  No current facility-administered medications for this encounter. Current Outpatient Medications   Medication Sig Dispense Refill    fluticasone-salmeterol (ADVAIR DISKUS) 250-50 MCG/ACT AEPB diskus inhaler Inhale 1 puff into the lungs in the morning and 1 puff in the evening.  1 each 5    acetaminophen (TYLENOL) 325 MG tablet Take 2 tablets by mouth      albuterol (PROVENTIL) (2.5 MG/3ML) 0.083% nebulizer solution Inhale 3 mLs into the lungs every 6 hours as needed      albuterol sulfate HFA (PROVENTIL;VENTOLIN;PROAIR) 108 (90 Base) MCG/ACT inhaler every 4 hours      VENTOLIN  (90 Base) MCG/ACT inhaler       Alirocumab 150 MG/ML SOAJ Inject 150 mg into the skin every 14 days      amitriptyline (ELAVIL) 150 MG tablet

## 2023-08-12 NOTE — ED TRIAGE NOTES
Patient states that she thinks she has pneumonia. Body aches,  chills, cough, and sore throat since Thursday.

## 2023-08-13 LAB
BACTERIA SPEC CULT: NORMAL
SERVICE CMNT-IMP: NORMAL

## 2023-08-14 ENCOUNTER — APPOINTMENT (OUTPATIENT)
Facility: HOSPITAL | Age: 63
End: 2023-08-14
Payer: MEDICARE

## 2023-08-14 LAB
BACTERIA SPEC CULT: NORMAL
SERVICE CMNT-IMP: NORMAL

## 2023-08-16 ENCOUNTER — APPOINTMENT (OUTPATIENT)
Facility: HOSPITAL | Age: 63
End: 2023-08-16
Payer: MEDICARE

## 2023-08-18 ENCOUNTER — APPOINTMENT (OUTPATIENT)
Facility: HOSPITAL | Age: 63
End: 2023-08-18
Payer: MEDICARE

## 2023-08-21 ENCOUNTER — APPOINTMENT (OUTPATIENT)
Facility: HOSPITAL | Age: 63
End: 2023-08-21
Payer: MEDICARE

## 2023-08-23 ENCOUNTER — APPOINTMENT (OUTPATIENT)
Facility: HOSPITAL | Age: 63
End: 2023-08-23
Payer: MEDICARE

## 2023-08-25 ENCOUNTER — APPOINTMENT (OUTPATIENT)
Facility: HOSPITAL | Age: 63
End: 2023-08-25
Payer: MEDICARE

## 2023-08-30 ENCOUNTER — HOSPITAL ENCOUNTER (OUTPATIENT)
Facility: HOSPITAL | Age: 63
Setting detail: RECURRING SERIES
Discharge: HOME | End: 2023-09-02
Payer: MEDICARE

## 2023-08-30 VITALS — WEIGHT: 208 LBS | BODY MASS INDEX: 32.58 KG/M2

## 2023-08-30 PROCEDURE — 93798 PHYS/QHP OP CAR RHAB W/ECG: CPT

## 2023-08-30 ASSESSMENT — EXERCISE STRESS TEST
PEAK_METS: 4.2
PEAK_RPE: 13
PEAK_HR: 96
PEAK_BP: 125/69

## 2023-08-30 ASSESSMENT — LIFESTYLE VARIABLES: SMOKELESS_TOBACCO: NO

## 2023-08-30 ASSESSMENT — EJECTION FRACTION: EF_VALUE: 65

## 2023-08-30 NOTE — PROGRESS NOTES
(TAVR, HTN, DLD, DM2)         Oxygen Intervention  Oxygen Use: No  O2 Sat Greater Than 90%: Yes    Individual Treatment Plan  ITP Visit Type: Re-assessment  1st Date of Exercise : 04/12/23  ITP Next Review Date: 09/27/23  Visit #/Total Visits: 24/36  EF%: 65 %  Risk Stratification: High  ITP: Exercise, Psychosocial, Tobacco, Nutrition, Education   Exercise   Stages of Change: Action  Assisted Devices: None           Exercise Prescription  Mode: Treadmill, Bike, Stepper, Ergometer, Rower, Other (comment)  Frequency per week: 2-3  Duration Per Session: 31-55  Intensity METS      : 2.45-4.45  RPE: 12-16  Progression: Progressing  Target Heart Rate:   Resistance Training: No   Exercise Blood Pressures  Resting BP: 126/72  Peak BP: 125/69  Is BP WDL: No    Exercise Activity at Home  Type: spinal rehab  Frequency: 2-3  Duration: 45-1 hr  Resistance Training: No    Exercise Education  Education: Self pulse, Exercise safety, Signs/symptoms to report, RPE scale, Equipment orientation, Warm up/cool down, Physically active    Exercise Target Goal  Target Goal(s):  Individual exercise RX, Aerobic activity 30 + minutes/day  5 days/week, BP < 140/90 or < 130/80, if DM or CKD  Patient Stated Exercise Goals: Ready to go back to living and doing things, it's going slow    Psychosocial  Stages of Change: Action    Psychosocial Intervention  Interventions: No intervention indicated  Currently Taking Psychotropic Meds: Yes  Medication Changes: No    Psychosocial Education  Education: Advanced directives, Benefits of CPR completion, Cardiac meds, Coping techniques, Environmental triggers, Impact self care behaviors on health, Relaxation techniques, Signs/symptoms of depression    Psychosocial Target Goals  Target Goal(s): Assess presence or absence of depression using a valid screening tool    Tobacco  Tobacco Use: No  Smokeless Tobacco Use: No              Nutrition  Stages of Change: Action  Diabetes: Yes  HgbA1c: 5.4

## 2023-08-31 ENCOUNTER — HOSPITAL ENCOUNTER (EMERGENCY)
Facility: HOSPITAL | Age: 63
Discharge: HOME | End: 2023-08-31
Attending: EMERGENCY MEDICINE
Payer: MEDICARE

## 2023-08-31 ENCOUNTER — APPOINTMENT (OUTPATIENT)
Facility: HOSPITAL | Age: 63
End: 2023-08-31
Payer: MEDICARE

## 2023-08-31 VITALS
HEIGHT: 67 IN | TEMPERATURE: 98.2 F | HEART RATE: 60 BPM | BODY MASS INDEX: 32.33 KG/M2 | DIASTOLIC BLOOD PRESSURE: 62 MMHG | SYSTOLIC BLOOD PRESSURE: 142 MMHG | OXYGEN SATURATION: 100 % | WEIGHT: 206 LBS | RESPIRATION RATE: 19 BRPM

## 2023-08-31 DIAGNOSIS — R07.9 CHEST PAIN, UNSPECIFIED TYPE: Primary | ICD-10-CM

## 2023-08-31 DIAGNOSIS — R11.2 NAUSEA AND VOMITING, UNSPECIFIED VOMITING TYPE: ICD-10-CM

## 2023-08-31 LAB
ALBUMIN SERPL-MCNC: 3.6 G/DL (ref 3.4–5)
ALBUMIN/GLOB SERPL: 0.9 (ref 0.8–1.7)
ALP SERPL-CCNC: 75 U/L (ref 45–117)
ALT SERPL-CCNC: 24 U/L (ref 13–56)
ANION GAP SERPL CALC-SCNC: 2 MMOL/L (ref 3–18)
AST SERPL-CCNC: 24 U/L (ref 10–38)
BASOPHILS # BLD: 0 K/UL (ref 0–0.1)
BASOPHILS NFR BLD: 0 % (ref 0–2)
BILIRUB SERPL-MCNC: 0.6 MG/DL (ref 0.2–1)
BUN SERPL-MCNC: 20 MG/DL (ref 7–18)
BUN/CREAT SERPL: 18 (ref 12–20)
CALCIUM SERPL-MCNC: 8.6 MG/DL (ref 8.5–10.1)
CHLORIDE SERPL-SCNC: 115 MMOL/L (ref 100–111)
CO2 SERPL-SCNC: 27 MMOL/L (ref 21–32)
CREAT SERPL-MCNC: 1.11 MG/DL (ref 0.6–1.3)
D DIMER PPP FEU-MCNC: 1.15 UG/ML(FEU)
DIFFERENTIAL METHOD BLD: ABNORMAL
EOSINOPHIL # BLD: 0 K/UL (ref 0–0.4)
EOSINOPHIL NFR BLD: 1 % (ref 0–5)
ERYTHROCYTE [DISTWIDTH] IN BLOOD BY AUTOMATED COUNT: 14 % (ref 11.6–14.5)
GLOBULIN SER CALC-MCNC: 4.1 G/DL (ref 2–4)
GLUCOSE SERPL-MCNC: 94 MG/DL (ref 74–99)
HCT VFR BLD AUTO: 36.6 % (ref 35–45)
HGB BLD-MCNC: 11.6 G/DL (ref 12–16)
IMM GRANULOCYTES # BLD AUTO: 0 K/UL (ref 0–0.04)
IMM GRANULOCYTES NFR BLD AUTO: 0 % (ref 0–0.5)
LIPASE SERPL-CCNC: 106 U/L (ref 73–393)
LYMPHOCYTES # BLD: 1.1 K/UL (ref 0.9–3.6)
LYMPHOCYTES NFR BLD: 19 % (ref 21–52)
MAGNESIUM SERPL-MCNC: 1.9 MG/DL (ref 1.6–2.6)
MCH RBC QN AUTO: 30.1 PG (ref 24–34)
MCHC RBC AUTO-ENTMCNC: 31.7 G/DL (ref 31–37)
MCV RBC AUTO: 94.8 FL (ref 78–100)
MONOCYTES # BLD: 0.4 K/UL (ref 0.05–1.2)
MONOCYTES NFR BLD: 7 % (ref 3–10)
NEUTS SEG # BLD: 4 K/UL (ref 1.8–8)
NEUTS SEG NFR BLD: 72 % (ref 40–73)
NRBC # BLD: 0 K/UL (ref 0–0.01)
NRBC BLD-RTO: 0 PER 100 WBC
PHOSPHATE SERPL-MCNC: 3.7 MG/DL (ref 2.5–4.9)
PLATELET # BLD AUTO: 152 K/UL (ref 135–420)
PMV BLD AUTO: 10 FL (ref 9.2–11.8)
POTASSIUM SERPL-SCNC: 4.1 MMOL/L (ref 3.5–5.5)
PROT SERPL-MCNC: 7.7 G/DL (ref 6.4–8.2)
RBC # BLD AUTO: 3.86 M/UL (ref 4.2–5.3)
SODIUM SERPL-SCNC: 144 MMOL/L (ref 136–145)
TROPONIN I SERPL HS-MCNC: 72 NG/L (ref 0–54)
TROPONIN I SERPL HS-MCNC: 75 NG/L (ref 0–54)
TROPONIN I SERPL HS-MCNC: 90 NG/L (ref 0–54)
TSH SERPL DL<=0.05 MIU/L-ACNC: 0.62 UIU/ML (ref 0.36–3.74)
WBC # BLD AUTO: 5.6 K/UL (ref 4.6–13.2)

## 2023-08-31 PROCEDURE — 71045 X-RAY EXAM CHEST 1 VIEW: CPT

## 2023-08-31 PROCEDURE — 6370000000 HC RX 637 (ALT 250 FOR IP): Performed by: EMERGENCY MEDICINE

## 2023-08-31 PROCEDURE — 83735 ASSAY OF MAGNESIUM: CPT

## 2023-08-31 PROCEDURE — 84484 ASSAY OF TROPONIN QUANT: CPT

## 2023-08-31 PROCEDURE — 93005 ELECTROCARDIOGRAM TRACING: CPT | Performed by: EMERGENCY MEDICINE

## 2023-08-31 PROCEDURE — 6360000004 HC RX CONTRAST MEDICATION: Performed by: EMERGENCY MEDICINE

## 2023-08-31 PROCEDURE — 85025 COMPLETE CBC W/AUTO DIFF WBC: CPT

## 2023-08-31 PROCEDURE — 85379 FIBRIN DEGRADATION QUANT: CPT

## 2023-08-31 PROCEDURE — 84443 ASSAY THYROID STIM HORMONE: CPT

## 2023-08-31 PROCEDURE — 99285 EMERGENCY DEPT VISIT HI MDM: CPT

## 2023-08-31 PROCEDURE — 84100 ASSAY OF PHOSPHORUS: CPT

## 2023-08-31 PROCEDURE — 80053 COMPREHEN METABOLIC PANEL: CPT

## 2023-08-31 PROCEDURE — 71275 CT ANGIOGRAPHY CHEST: CPT

## 2023-08-31 PROCEDURE — 83690 ASSAY OF LIPASE: CPT

## 2023-08-31 RX ORDER — ASPIRIN 325 MG
325 TABLET ORAL
Status: COMPLETED | OUTPATIENT
Start: 2023-08-31 | End: 2023-08-31

## 2023-08-31 RX ADMIN — ASPIRIN 325 MG: 325 TABLET ORAL at 19:28

## 2023-08-31 RX ADMIN — IOPAMIDOL 80 ML: 755 INJECTION, SOLUTION INTRAVENOUS at 17:10

## 2023-08-31 ASSESSMENT — PAIN - FUNCTIONAL ASSESSMENT: PAIN_FUNCTIONAL_ASSESSMENT: 0-10

## 2023-08-31 ASSESSMENT — PAIN SCALES - GENERAL: PAINLEVEL_OUTOF10: 7

## 2023-08-31 ASSESSMENT — PAIN DESCRIPTION - LOCATION: LOCATION: CHEST

## 2023-08-31 NOTE — ED PROVIDER NOTES
tablet Take 100 mg by mouth daily (Patient not taking: Reported on 4/10/2023)      sucralfate (CARAFATE) 1 GM tablet Take 1 g by mouth 3 times daily (with meals) (Patient not taking: Reported on 4/10/2023)      SUNItinib Malate 37.5 MG CAPS Take 37.5 mg by mouth daily (Patient not taking: Reported on 4/10/2023)      topiramate (TOPAMAX) 100 MG tablet Take 1 tablet by mouth 2 times daily      traMADol (ULTRAM) 50 MG tablet Take 3 tablets by mouth every 6 hours as needed. venlafaxine (EFFEXOR XR) 75 MG extended release capsule       VORTIoxetine (TRINTELLIX) 10 MG TABS tablet Take 10 mg by mouth daily (Patient not taking: Reported on 3/22/2023)      Calcium Carb-Cholecalciferol (CALTRATE 600+D3 SOFT) 600-20 MG-MCG CHEW Caltrate 600 plus D  600 mg-20 mcg (800 unit) chewable tablet   Take by oral route. ferrous sulfate (IRON 325) 325 (65 Fe) MG tablet FeroSul 325 mg (65 mg iron) tablet   Take 1 tablet every day by oral route. fluticasone (FLONASE) 50 MCG/ACT nasal spray Flonase Allergy Relief 50 mcg/actuation nasal spray,suspension   Spray 1 spray every day by intranasal route.          ALLERGIES:  Allergies   Allergen Reactions    Ace Inhibitors Other (See Comments), Anaphylaxis and Swelling     Other reaction(s): Angioedema, Lips/Mouth swelling-Allergy, other/intolerance, Unknown    Angiotensin Receptor Blockers Anaphylaxis     Other reaction(s): Angioedema, GI intolerance, Lips/Mouth swelling-Allergy, other/intolerance, Unknown    Beta Adrenergic Blockers Anaphylaxis     Unclear     Fish Allergy Anaphylaxis    Fish-Derived Products Other (See Comments) and Anaphylaxis    Flavoring Agent Anaphylaxis     shrimp    Levofloxacin Anaphylaxis     Other reaction(s): other/intolerance    Shellfish-Derived Products Anaphylaxis    Shrimp Extract Allergy Skin Test Hives, Anaphylaxis and Swelling     Other reaction(s): Lips/Mouth swelling-Allergy, Unknown    Benzodiazepines      Other reaction(s): GI intolerance, suspected    MEDICATIONS ADMINISTERED IN THE ED:  Medications   aspirin tablet 325 mg (has no administration in time range)   iopamidol (ISOVUE-370) 76 % injection 80 mL (80 mLs IntraVENous Given 8/31/23 1710)            IP CONSULT TO CARDIOLOGY    Critical Care: None    Diagnosis and Disposition   Diagnosis:   1. Chest pain, unspecified type    2. Nausea and vomiting, unspecified vomiting type          Disposition:    DISPOSITION Decision To Discharge 08/31/2023 07:10:46 PM    Home or [address] [place] [address]     Please note that this dictation was completed with [employer], the [employer] voice recognition software. Quite often unanticipated grammatical, syntax, homophones, and other interpretive errors are inadvertently transcribed by the computer software. Please disregard these errors. Please excuse any errors that have escaped final proofreading. Geneva Thakur DO am the primary clinician of record. Deloris Thakur DO  (Electronically signed)          Suzette Alves DO  08/31/23 1924

## 2023-08-31 NOTE — ED TRIAGE NOTES
Patient started having chest pain 0730, sob, n/v. Chest pain rating 7/10, sharp, pressure, radiating to right arm, under left breast to left upper back.

## 2023-09-01 LAB
EKG ATRIAL RATE: 63 BPM
EKG DIAGNOSIS: NORMAL
EKG P AXIS: 49 DEGREES
EKG P-R INTERVAL: 160 MS
EKG Q-T INTERVAL: 410 MS
EKG QRS DURATION: 90 MS
EKG QTC CALCULATION (BAZETT): 419 MS
EKG R AXIS: 0 DEGREES
EKG T AXIS: 44 DEGREES
EKG VENTRICULAR RATE: 63 BPM

## 2023-09-01 PROCEDURE — 93010 ELECTROCARDIOGRAM REPORT: CPT | Performed by: INTERNAL MEDICINE

## 2023-09-27 ENCOUNTER — HOSPITAL ENCOUNTER (OUTPATIENT)
Facility: HOSPITAL | Age: 63
Setting detail: RECURRING SERIES
Discharge: HOME | End: 2023-09-30
Payer: MEDICARE

## 2023-09-27 VITALS — WEIGHT: 208 LBS | BODY MASS INDEX: 32.58 KG/M2

## 2023-09-27 LAB — GLUCOSE BLD STRIP.AUTO-MCNC: 107 MG/DL (ref 70–110)

## 2023-09-27 PROCEDURE — 82962 GLUCOSE BLOOD TEST: CPT

## 2023-09-27 PROCEDURE — 93798 PHYS/QHP OP CAR RHAB W/ECG: CPT

## 2023-09-27 ASSESSMENT — EXERCISE STRESS TEST
PEAK_HR: 107
PEAK_METS: 2.5
PEAK_BP: 136/79
PEAK_RPE: 13

## 2023-09-27 ASSESSMENT — LIFESTYLE VARIABLES: SMOKELESS_TOBACCO: NO

## 2023-09-27 ASSESSMENT — EJECTION FRACTION: EF_VALUE: 65

## 2023-09-27 NOTE — PROGRESS NOTES
health, Relaxation techniques, Signs/symptoms of depression    Psychosocial Target Goals  Target Goal(s): Assess presence or absence of depression using a valid screening tool    Tobacco  Tobacco Use: No  Smokeless Tobacco Use: No         Nutrition  Stages of Change: Action  Diabetes: Yes  HgbA1c: 5.4  BG in Range: Yes (107)         HgbA1c: 5.4         BG in Range: Yes (107)         Lipids  Date Lipids Drawn: 02/27/23  Total: 195  HDL: 19  LDL: 78  Triglycerides: 82  Lipid Med(s): Yes  Lipid Med Change(s): No (Initial)    Weight Management  Weight : 208 lb (94.3 kg)  Height : 5' 7\" (170.2 cm)  BMI: 32.65  Alcohol: None  Nutrition Assessment Tool: Rate your plate         Nutrition Education  Education: Signs/symptoms/treatment of hypo/hyperglycemia, DM & CAD relationship, Low saturated fat diet, Low sodium diet, Limit added sugars, Overall healthy eating pattern that emphasizes vegetables, fruits, wholegrains, healthy proteins and non-tropical oils, Reduced calorie/portion controlled diet    Nutrition Target Goals  Target Goals: Weight loss of 5-10%, Waist less than 40 inches males, 35 for females, HbA1C less than 7 percent for those with diabetes, Triglycerides less than 150, LDL-C less than 70 and non-HDL-C <100 for those with ASCVD         Education  Learning Barrier: Ready to learn    Education Intervention  Education Schedule Given: Yes    Patient Education   Education: Signs/Symptoms of angina, Cardiac A&P, Risk factors, Med compliance, CAD  Hypertension: Yes  Hypertension Controlled: No  Is BP WDL: No  Med(s) Change: No  BP Meds: Norvasc, IMDUR, Metoprolol, nitroglycerine,    Education Target Goals  Target Goals: Understand target guidelines for B/P, Understand target guidelines for lipids, Risk factors, Medication compliance    Staff Treatment Goals  Goals: Exercise  Exercise Goal: Increase endurance and stamina  Exercise Goal Status: Progressing         Exercise Log    Rehab Common Questions  Any Problems or

## 2023-09-28 ENCOUNTER — HOSPITAL ENCOUNTER (OUTPATIENT)
Facility: HOSPITAL | Age: 63
Setting detail: RECURRING SERIES
End: 2023-09-28
Payer: MEDICARE

## 2023-09-28 VITALS — BODY MASS INDEX: 32.58 KG/M2 | WEIGHT: 208 LBS

## 2023-09-28 PROCEDURE — 93798 PHYS/QHP OP CAR RHAB W/ECG: CPT

## 2023-09-28 ASSESSMENT — EXERCISE STRESS TEST
PEAK_RPE: 14
PEAK_METS: 2.15
PEAK_HR: 120

## 2023-09-29 ENCOUNTER — HOSPITAL ENCOUNTER (OUTPATIENT)
Facility: HOSPITAL | Age: 63
Setting detail: RECURRING SERIES
End: 2023-09-29
Payer: MEDICARE

## 2023-09-29 VITALS — BODY MASS INDEX: 32.73 KG/M2 | WEIGHT: 209 LBS

## 2023-09-29 PROCEDURE — 93798 PHYS/QHP OP CAR RHAB W/ECG: CPT

## 2023-09-29 ASSESSMENT — EXERCISE STRESS TEST
PEAK_METS: 3.2
PEAK_RPE: 13
PEAK_HR: 124

## 2023-10-02 ENCOUNTER — HOSPITAL ENCOUNTER (OUTPATIENT)
Facility: HOSPITAL | Age: 63
Setting detail: RECURRING SERIES
Discharge: HOME | End: 2023-10-05
Payer: MEDICARE

## 2023-10-02 VITALS — WEIGHT: 209 LBS | BODY MASS INDEX: 32.73 KG/M2

## 2023-10-02 LAB — GLUCOSE BLD STRIP.AUTO-MCNC: 103 MG/DL (ref 70–110)

## 2023-10-02 PROCEDURE — 93798 PHYS/QHP OP CAR RHAB W/ECG: CPT

## 2023-10-02 PROCEDURE — 82962 GLUCOSE BLOOD TEST: CPT

## 2023-10-02 ASSESSMENT — EXERCISE STRESS TEST
PEAK_RPE: 13
PEAK_HR: 132
PEAK_METS: 4.5

## 2023-10-03 ENCOUNTER — HOSPITAL ENCOUNTER (OUTPATIENT)
Facility: HOSPITAL | Age: 63
Setting detail: RECURRING SERIES
Discharge: HOME | End: 2023-10-06
Payer: MEDICARE

## 2023-10-03 VITALS — BODY MASS INDEX: 32.89 KG/M2 | WEIGHT: 210 LBS

## 2023-10-03 PROCEDURE — 93798 PHYS/QHP OP CAR RHAB W/ECG: CPT

## 2023-10-03 ASSESSMENT — EXERCISE STRESS TEST
PEAK_HR: 107
PEAK_METS: 2.5
PEAK_RPE: 12

## 2023-10-04 ENCOUNTER — HOSPITAL ENCOUNTER (OUTPATIENT)
Facility: HOSPITAL | Age: 63
Setting detail: RECURRING SERIES
Discharge: HOME | End: 2023-10-07
Payer: MEDICARE

## 2023-10-04 VITALS — BODY MASS INDEX: 32.89 KG/M2 | WEIGHT: 210 LBS

## 2023-10-04 PROCEDURE — 93798 PHYS/QHP OP CAR RHAB W/ECG: CPT

## 2023-10-04 ASSESSMENT — EXERCISE STRESS TEST
PEAK_METS: 2.5
PEAK_RPE: 13
PEAK_HR: 114

## 2023-10-06 ENCOUNTER — APPOINTMENT (OUTPATIENT)
Facility: HOSPITAL | Age: 63
End: 2023-10-06
Payer: MEDICARE

## 2023-10-09 ENCOUNTER — APPOINTMENT (OUTPATIENT)
Facility: HOSPITAL | Age: 63
End: 2023-10-09
Payer: MEDICARE

## 2023-10-12 ENCOUNTER — HOSPITAL ENCOUNTER (INPATIENT)
Facility: HOSPITAL | Age: 63
LOS: 4 days | Discharge: HOME HEALTH | DRG: 065 | End: 2023-10-16
Attending: EMERGENCY MEDICINE | Admitting: HOSPITALIST
Payer: MEDICARE

## 2023-10-12 ENCOUNTER — APPOINTMENT (OUTPATIENT)
Facility: HOSPITAL | Age: 63
DRG: 065 | End: 2023-10-12
Payer: MEDICARE

## 2023-10-12 DIAGNOSIS — R47.01 EXPRESSIVE APHASIA: ICD-10-CM

## 2023-10-12 DIAGNOSIS — I63.9 ISCHEMIC STROKE (HCC): Primary | ICD-10-CM

## 2023-10-12 DIAGNOSIS — R51.9 NONINTRACTABLE HEADACHE, UNSPECIFIED CHRONICITY PATTERN, UNSPECIFIED HEADACHE TYPE: ICD-10-CM

## 2023-10-12 PROBLEM — E78.5 HLD (HYPERLIPIDEMIA): Status: ACTIVE | Noted: 2023-10-12

## 2023-10-12 PROBLEM — J45.909 ASTHMA: Status: ACTIVE | Noted: 2023-10-12

## 2023-10-12 PROBLEM — I10 HTN (HYPERTENSION): Status: ACTIVE | Noted: 2023-10-12

## 2023-10-12 PROBLEM — G45.9 TIA (TRANSIENT ISCHEMIC ATTACK): Status: ACTIVE | Noted: 2023-10-12

## 2023-10-12 PROBLEM — G31.84 MILD COGNITIVE IMPAIRMENT: Status: ACTIVE | Noted: 2023-10-12

## 2023-10-12 PROBLEM — D64.9 NORMOCYTIC ANEMIA: Status: ACTIVE | Noted: 2023-10-12

## 2023-10-12 PROBLEM — Z85.528 HISTORY OF RENAL CELL CANCER: Status: ACTIVE | Noted: 2023-10-12

## 2023-10-12 PROBLEM — E04.2 MULTIPLE THYROID NODULES: Status: ACTIVE | Noted: 2023-10-12

## 2023-10-12 LAB
ALBUMIN SERPL-MCNC: 3.5 G/DL (ref 3.4–5)
ALBUMIN/GLOB SERPL: 0.9 (ref 0.8–1.7)
ALP SERPL-CCNC: 77 U/L (ref 45–117)
ALT SERPL-CCNC: 25 U/L (ref 13–56)
ANION GAP SERPL CALC-SCNC: 4 MMOL/L (ref 3–18)
APPEARANCE UR: CLEAR
APTT PPP: 29.1 SEC (ref 23–36.4)
AST SERPL-CCNC: 19 U/L (ref 10–38)
BASOPHILS # BLD: 0 K/UL (ref 0–0.1)
BASOPHILS NFR BLD: 1 % (ref 0–2)
BILIRUB SERPL-MCNC: 0.5 MG/DL (ref 0.2–1)
BILIRUB UR QL: NEGATIVE
BUN SERPL-MCNC: 21 MG/DL (ref 7–18)
BUN/CREAT SERPL: 22 (ref 12–20)
CALCIUM SERPL-MCNC: 8 MG/DL (ref 8.5–10.1)
CHLORIDE SERPL-SCNC: 120 MMOL/L (ref 100–111)
CHOLEST SERPL-MCNC: 207 MG/DL
CHP ED QC CHECK: NORMAL
CO2 SERPL-SCNC: 22 MMOL/L (ref 21–32)
COLOR UR: YELLOW
CREAT SERPL-MCNC: 0.94 MG/DL (ref 0.6–1.3)
DIFFERENTIAL METHOD BLD: ABNORMAL
EKG ATRIAL RATE: 66 BPM
EKG DIAGNOSIS: NORMAL
EKG P AXIS: 38 DEGREES
EKG P-R INTERVAL: 150 MS
EKG Q-T INTERVAL: 422 MS
EKG QRS DURATION: 94 MS
EKG QTC CALCULATION (BAZETT): 442 MS
EKG R AXIS: 20 DEGREES
EKG T AXIS: 54 DEGREES
EKG VENTRICULAR RATE: 66 BPM
EOSINOPHIL # BLD: 0.2 K/UL (ref 0–0.4)
EOSINOPHIL NFR BLD: 3 % (ref 0–5)
ERYTHROCYTE [DISTWIDTH] IN BLOOD BY AUTOMATED COUNT: 13.7 % (ref 11.6–14.5)
FERRITIN SERPL-MCNC: 36 NG/ML (ref 8–388)
FOLATE SERPL-MCNC: >20 NG/ML (ref 3.1–17.5)
GLOBULIN SER CALC-MCNC: 3.9 G/DL (ref 2–4)
GLUCOSE BLD STRIP.AUTO-MCNC: 150 MG/DL (ref 70–110)
GLUCOSE BLD STRIP.AUTO-MCNC: 73 MG/DL (ref 70–110)
GLUCOSE BLD-MCNC: 150 MG/DL
GLUCOSE SERPL-MCNC: 141 MG/DL (ref 74–99)
GLUCOSE UR STRIP.AUTO-MCNC: NEGATIVE MG/DL
HCT VFR BLD AUTO: 35.5 % (ref 35–45)
HDLC SERPL-MCNC: 102 MG/DL (ref 40–60)
HDLC SERPL: 2 (ref 0–5)
HGB BLD-MCNC: 11.2 G/DL (ref 12–16)
HGB UR QL STRIP: NEGATIVE
IMM GRANULOCYTES # BLD AUTO: 0 K/UL (ref 0–0.04)
IMM GRANULOCYTES NFR BLD AUTO: 0 % (ref 0–0.5)
INR PPP: 0.9 (ref 0.9–1.1)
IRON SATN MFR SERPL: 17 % (ref 20–50)
IRON SERPL-MCNC: 48 UG/DL (ref 50–175)
KETONES UR QL STRIP.AUTO: NEGATIVE MG/DL
LDLC SERPL CALC-MCNC: 85.2 MG/DL (ref 0–100)
LEUKOCYTE ESTERASE UR QL STRIP.AUTO: NEGATIVE
LIPID PANEL: ABNORMAL
LYMPHOCYTES # BLD: 1.4 K/UL (ref 0.9–3.6)
LYMPHOCYTES NFR BLD: 26 % (ref 21–52)
MAGNESIUM SERPL-MCNC: 2 MG/DL (ref 1.6–2.6)
MCH RBC QN AUTO: 30.4 PG (ref 24–34)
MCHC RBC AUTO-ENTMCNC: 31.5 G/DL (ref 31–37)
MCV RBC AUTO: 96.2 FL (ref 78–100)
MONOCYTES # BLD: 0.3 K/UL (ref 0.05–1.2)
MONOCYTES NFR BLD: 6 % (ref 3–10)
NEUTS SEG # BLD: 3.4 K/UL (ref 1.8–8)
NEUTS SEG NFR BLD: 64 % (ref 40–73)
NITRITE UR QL STRIP.AUTO: NEGATIVE
NRBC # BLD: 0 K/UL (ref 0–0.01)
NRBC BLD-RTO: 0 PER 100 WBC
PH UR STRIP: 6.5 (ref 5–8)
PLATELET # BLD AUTO: 165 K/UL (ref 135–420)
PMV BLD AUTO: 8.8 FL (ref 9.2–11.8)
POTASSIUM SERPL-SCNC: 3.6 MMOL/L (ref 3.5–5.5)
PROT SERPL-MCNC: 7.4 G/DL (ref 6.4–8.2)
PROT UR STRIP-MCNC: NEGATIVE MG/DL
PROTHROMBIN TIME: 12.7 SEC (ref 11.9–14.7)
RBC # BLD AUTO: 3.69 M/UL (ref 4.2–5.3)
SODIUM SERPL-SCNC: 146 MMOL/L (ref 136–145)
SP GR UR REFRACTOMETRY: >1.03 (ref 1–1.03)
TIBC SERPL-MCNC: 286 UG/DL (ref 250–450)
TRIGL SERPL-MCNC: 99 MG/DL
TROPONIN I SERPL HS-MCNC: 20 NG/L (ref 0–54)
TSH SERPL DL<=0.05 MIU/L-ACNC: 0.42 UIU/ML (ref 0.36–3.74)
UROBILINOGEN UR QL STRIP.AUTO: 1 EU/DL (ref 0.2–1)
VIT B12 SERPL-MCNC: 615 PG/ML (ref 211–911)
VLDLC SERPL CALC-MCNC: 19.8 MG/DL
WBC # BLD AUTO: 5.4 K/UL (ref 4.6–13.2)

## 2023-10-12 PROCEDURE — 85025 COMPLETE CBC W/AUTO DIFF WBC: CPT

## 2023-10-12 PROCEDURE — 82962 GLUCOSE BLOOD TEST: CPT

## 2023-10-12 PROCEDURE — 83036 HEMOGLOBIN GLYCOSYLATED A1C: CPT

## 2023-10-12 PROCEDURE — 93005 ELECTROCARDIOGRAM TRACING: CPT | Performed by: EMERGENCY MEDICINE

## 2023-10-12 PROCEDURE — 6370000000 HC RX 637 (ALT 250 FOR IP): Performed by: EMERGENCY MEDICINE

## 2023-10-12 PROCEDURE — 83550 IRON BINDING TEST: CPT

## 2023-10-12 PROCEDURE — 36415 COLL VENOUS BLD VENIPUNCTURE: CPT

## 2023-10-12 PROCEDURE — 2580000003 HC RX 258: Performed by: STUDENT IN AN ORGANIZED HEALTH CARE EDUCATION/TRAINING PROGRAM

## 2023-10-12 PROCEDURE — 84443 ASSAY THYROID STIM HORMONE: CPT

## 2023-10-12 PROCEDURE — 82746 ASSAY OF FOLIC ACID SERUM: CPT

## 2023-10-12 PROCEDURE — 85610 PROTHROMBIN TIME: CPT

## 2023-10-12 PROCEDURE — 6360000002 HC RX W HCPCS: Performed by: STUDENT IN AN ORGANIZED HEALTH CARE EDUCATION/TRAINING PROGRAM

## 2023-10-12 PROCEDURE — 80061 LIPID PANEL: CPT

## 2023-10-12 PROCEDURE — 83540 ASSAY OF IRON: CPT

## 2023-10-12 PROCEDURE — 6370000000 HC RX 637 (ALT 250 FOR IP): Performed by: STUDENT IN AN ORGANIZED HEALTH CARE EDUCATION/TRAINING PROGRAM

## 2023-10-12 PROCEDURE — 99223 1ST HOSP IP/OBS HIGH 75: CPT | Performed by: STUDENT IN AN ORGANIZED HEALTH CARE EDUCATION/TRAINING PROGRAM

## 2023-10-12 PROCEDURE — 81003 URINALYSIS AUTO W/O SCOPE: CPT

## 2023-10-12 PROCEDURE — 84484 ASSAY OF TROPONIN QUANT: CPT

## 2023-10-12 PROCEDURE — 83735 ASSAY OF MAGNESIUM: CPT

## 2023-10-12 PROCEDURE — 82728 ASSAY OF FERRITIN: CPT

## 2023-10-12 PROCEDURE — 70498 CT ANGIOGRAPHY NECK: CPT

## 2023-10-12 PROCEDURE — 93010 ELECTROCARDIOGRAM REPORT: CPT | Performed by: INTERNAL MEDICINE

## 2023-10-12 PROCEDURE — 1100000000 HC RM PRIVATE

## 2023-10-12 PROCEDURE — 70450 CT HEAD/BRAIN W/O DYE: CPT

## 2023-10-12 PROCEDURE — G0378 HOSPITAL OBSERVATION PER HR: HCPCS

## 2023-10-12 PROCEDURE — 82607 VITAMIN B-12: CPT

## 2023-10-12 PROCEDURE — 80053 COMPREHEN METABOLIC PANEL: CPT

## 2023-10-12 PROCEDURE — 99285 EMERGENCY DEPT VISIT HI MDM: CPT

## 2023-10-12 PROCEDURE — 85730 THROMBOPLASTIN TIME PARTIAL: CPT

## 2023-10-12 PROCEDURE — 6360000004 HC RX CONTRAST MEDICATION: Performed by: EMERGENCY MEDICINE

## 2023-10-12 RX ORDER — SODIUM CHLORIDE 0.9 % (FLUSH) 0.9 %
5-40 SYRINGE (ML) INJECTION EVERY 12 HOURS SCHEDULED
Status: DISCONTINUED | OUTPATIENT
Start: 2023-10-12 | End: 2023-10-16 | Stop reason: HOSPADM

## 2023-10-12 RX ORDER — MIRTAZAPINE 15 MG/1
15 TABLET, FILM COATED ORAL NIGHTLY
Status: DISCONTINUED | OUTPATIENT
Start: 2023-10-12 | End: 2023-10-16 | Stop reason: HOSPADM

## 2023-10-12 RX ORDER — CLOPIDOGREL BISULFATE 75 MG/1
75 TABLET ORAL DAILY
Status: DISCONTINUED | OUTPATIENT
Start: 2023-10-13 | End: 2023-10-16 | Stop reason: HOSPADM

## 2023-10-12 RX ORDER — BUDESONIDE AND FORMOTEROL FUMARATE DIHYDRATE 160; 4.5 UG/1; UG/1
2 AEROSOL RESPIRATORY (INHALATION)
Status: DISCONTINUED | OUTPATIENT
Start: 2023-10-12 | End: 2023-10-12

## 2023-10-12 RX ORDER — BUDESONIDE 0.5 MG/2ML
0.5 INHALANT ORAL
Status: DISCONTINUED | OUTPATIENT
Start: 2023-10-12 | End: 2023-10-16 | Stop reason: HOSPADM

## 2023-10-12 RX ORDER — CETIRIZINE HYDROCHLORIDE 10 MG/1
10 TABLET ORAL DAILY
Status: DISCONTINUED | OUTPATIENT
Start: 2023-10-13 | End: 2023-10-16 | Stop reason: HOSPADM

## 2023-10-12 RX ORDER — ACETAMINOPHEN 325 MG/1
650 TABLET ORAL
Status: COMPLETED | OUTPATIENT
Start: 2023-10-12 | End: 2023-10-12

## 2023-10-12 RX ORDER — ACETAMINOPHEN 325 MG/1
650 TABLET ORAL EVERY 6 HOURS PRN
Status: DISCONTINUED | OUTPATIENT
Start: 2023-10-12 | End: 2023-10-12

## 2023-10-12 RX ORDER — ACETAMINOPHEN 650 MG/1
650 SUPPOSITORY RECTAL EVERY 6 HOURS PRN
Status: DISCONTINUED | OUTPATIENT
Start: 2023-10-12 | End: 2023-10-12

## 2023-10-12 RX ORDER — TOPIRAMATE 100 MG/1
100 TABLET, FILM COATED ORAL 2 TIMES DAILY
Status: DISCONTINUED | OUTPATIENT
Start: 2023-10-12 | End: 2023-10-13

## 2023-10-12 RX ORDER — POLYETHYLENE GLYCOL 3350 17 G/17G
17 POWDER, FOR SOLUTION ORAL DAILY PRN
Status: DISCONTINUED | OUTPATIENT
Start: 2023-10-12 | End: 2023-10-16 | Stop reason: HOSPADM

## 2023-10-12 RX ORDER — FERROUS SULFATE 325(65) MG
325 TABLET ORAL
Status: DISCONTINUED | OUTPATIENT
Start: 2023-10-13 | End: 2023-10-16 | Stop reason: HOSPADM

## 2023-10-12 RX ORDER — ASPIRIN 81 MG/1
81 TABLET, CHEWABLE ORAL DAILY
Status: DISCONTINUED | OUTPATIENT
Start: 2023-10-13 | End: 2023-10-16 | Stop reason: HOSPADM

## 2023-10-12 RX ORDER — ONDANSETRON 4 MG/1
4 TABLET, ORALLY DISINTEGRATING ORAL EVERY 8 HOURS PRN
Status: DISCONTINUED | OUTPATIENT
Start: 2023-10-12 | End: 2023-10-16 | Stop reason: HOSPADM

## 2023-10-12 RX ORDER — ACETAMINOPHEN 500 MG
1000 TABLET ORAL EVERY 8 HOURS PRN
Status: DISCONTINUED | OUTPATIENT
Start: 2023-10-12 | End: 2023-10-16 | Stop reason: HOSPADM

## 2023-10-12 RX ORDER — ALBUTEROL SULFATE 90 UG/1
1 AEROSOL, METERED RESPIRATORY (INHALATION) EVERY 4 HOURS PRN
Status: DISCONTINUED | OUTPATIENT
Start: 2023-10-12 | End: 2023-10-12

## 2023-10-12 RX ORDER — PANTOPRAZOLE SODIUM 40 MG/1
40 TABLET, DELAYED RELEASE ORAL
Status: DISCONTINUED | OUTPATIENT
Start: 2023-10-13 | End: 2023-10-16 | Stop reason: HOSPADM

## 2023-10-12 RX ORDER — SODIUM CHLORIDE 0.9 % (FLUSH) 0.9 %
5-40 SYRINGE (ML) INJECTION PRN
Status: DISCONTINUED | OUTPATIENT
Start: 2023-10-12 | End: 2023-10-16 | Stop reason: HOSPADM

## 2023-10-12 RX ORDER — ONDANSETRON 2 MG/ML
4 INJECTION INTRAMUSCULAR; INTRAVENOUS EVERY 6 HOURS PRN
Status: DISCONTINUED | OUTPATIENT
Start: 2023-10-12 | End: 2023-10-16 | Stop reason: HOSPADM

## 2023-10-12 RX ORDER — VENLAFAXINE HYDROCHLORIDE 75 MG/1
75 CAPSULE, EXTENDED RELEASE ORAL
Status: DISCONTINUED | OUTPATIENT
Start: 2023-10-13 | End: 2023-10-16 | Stop reason: HOSPADM

## 2023-10-12 RX ORDER — SODIUM CHLORIDE 9 MG/ML
INJECTION, SOLUTION INTRAVENOUS PRN
Status: DISCONTINUED | OUTPATIENT
Start: 2023-10-12 | End: 2023-10-16 | Stop reason: HOSPADM

## 2023-10-12 RX ORDER — CLOPIDOGREL BISULFATE 75 MG/1
75 TABLET ORAL
Status: COMPLETED | OUTPATIENT
Start: 2023-10-12 | End: 2023-10-12

## 2023-10-12 RX ORDER — ARFORMOTEROL TARTRATE 15 UG/2ML
15 SOLUTION RESPIRATORY (INHALATION)
Status: DISCONTINUED | OUTPATIENT
Start: 2023-10-12 | End: 2023-10-16 | Stop reason: HOSPADM

## 2023-10-12 RX ORDER — ATORVASTATIN CALCIUM 40 MG/1
80 TABLET, FILM COATED ORAL
Status: COMPLETED | OUTPATIENT
Start: 2023-10-12 | End: 2023-10-12

## 2023-10-12 RX ORDER — AMLODIPINE BESYLATE 5 MG/1
5 TABLET ORAL DAILY
Status: DISCONTINUED | OUTPATIENT
Start: 2023-10-12 | End: 2023-10-16

## 2023-10-12 RX ORDER — ENOXAPARIN SODIUM 100 MG/ML
40 INJECTION SUBCUTANEOUS EVERY 24 HOURS
Status: DISCONTINUED | OUTPATIENT
Start: 2023-10-12 | End: 2023-10-16 | Stop reason: HOSPADM

## 2023-10-12 RX ORDER — ATORVASTATIN CALCIUM 40 MG/1
80 TABLET, FILM COATED ORAL NIGHTLY
Status: DISCONTINUED | OUTPATIENT
Start: 2023-10-13 | End: 2023-10-16 | Stop reason: HOSPADM

## 2023-10-12 RX ORDER — ALBUTEROL SULFATE 2.5 MG/3ML
2.5 SOLUTION RESPIRATORY (INHALATION) EVERY 6 HOURS PRN
Status: DISCONTINUED | OUTPATIENT
Start: 2023-10-12 | End: 2023-10-16 | Stop reason: HOSPADM

## 2023-10-12 RX ADMIN — SODIUM CHLORIDE, PRESERVATIVE FREE 10 ML: 5 INJECTION INTRAVENOUS at 21:40

## 2023-10-12 RX ADMIN — AMLODIPINE BESYLATE 5 MG: 5 TABLET ORAL at 22:34

## 2023-10-12 RX ADMIN — ATORVASTATIN CALCIUM 80 MG: 40 TABLET, FILM COATED ORAL at 18:25

## 2023-10-12 RX ADMIN — IOPAMIDOL 80 ML: 755 INJECTION, SOLUTION INTRAVENOUS at 15:14

## 2023-10-12 RX ADMIN — MIRTAZAPINE 15 MG: 15 TABLET, FILM COATED ORAL at 21:48

## 2023-10-12 RX ADMIN — ACETAMINOPHEN 325MG 650 MG: 325 TABLET ORAL at 16:33

## 2023-10-12 RX ADMIN — CLOPIDOGREL BISULFATE 75 MG: 75 TABLET ORAL at 16:33

## 2023-10-12 RX ADMIN — TOPIRAMATE 100 MG: 100 TABLET, FILM COATED ORAL at 21:48

## 2023-10-12 RX ADMIN — ENOXAPARIN SODIUM 40 MG: 100 INJECTION SUBCUTANEOUS at 21:35

## 2023-10-12 RX ADMIN — ACETAMINOPHEN 1000 MG: 500 TABLET ORAL at 23:02

## 2023-10-12 ASSESSMENT — PAIN DESCRIPTION - LOCATION
LOCATION: HEAD

## 2023-10-12 ASSESSMENT — LIFESTYLE VARIABLES
HOW OFTEN DO YOU HAVE A DRINK CONTAINING ALCOHOL: NEVER
HOW MANY STANDARD DRINKS CONTAINING ALCOHOL DO YOU HAVE ON A TYPICAL DAY: PATIENT DOES NOT DRINK

## 2023-10-12 ASSESSMENT — PAIN SCALES - GENERAL
PAINLEVEL_OUTOF10: 10
PAINLEVEL_OUTOF10: 8
PAINLEVEL_OUTOF10: 4
PAINLEVEL_OUTOF10: 8
PAINLEVEL_OUTOF10: 4
PAINLEVEL_OUTOF10: 8

## 2023-10-12 ASSESSMENT — ENCOUNTER SYMPTOMS
DIARRHEA: 0
TROUBLE SWALLOWING: 0
RHINORRHEA: 0
ABDOMINAL PAIN: 0
SHORTNESS OF BREATH: 0
NAUSEA: 0
SORE THROAT: 0
BACK PAIN: 0
VOMITING: 0
COUGH: 0

## 2023-10-12 ASSESSMENT — PAIN SCALES - WONG BAKER: WONGBAKER_NUMERICALRESPONSE: 4

## 2023-10-12 ASSESSMENT — PAIN DESCRIPTION - ORIENTATION: ORIENTATION: OTHER (COMMENT)

## 2023-10-12 ASSESSMENT — PAIN DESCRIPTION - DESCRIPTORS: DESCRIPTORS: ACHING

## 2023-10-12 NOTE — ED NOTES
TRANSFER - OUT REPORT:    Verbal report given to Lul Moore RN on Sintia Hale  being transferred to [facility] for routine progression of patient care       Report consisted of patient's Situation, Background, Assessment and   Recommendations(SBAR). Information from the following report(s) Nurse Handoff Report was reviewed with the receiving nurse. [place] Fall Assessment:    Presents to emergency department  because of falls (Syncope, seizure, or loss of consciousness): No  Age > 70: No  Altered Mental Status, Intoxication with alcohol or substance confusion (Disorientation, impaired judgment, poor safety awaremess, or inability to follow instructions): Yes  Impaired Mobility: Ambulates or transfers with assistive devices or assistance; Unable to ambulate or transer.: No  Nursing Judgement: No          Lines:       Opportunity for questions and clarification was provided.       Patient transported with:  Monitor          Sam Lantigua RN  10/12/23 [id number]

## 2023-10-12 NOTE — ED NOTES
Daughter Brady Harris updated regarding transfer (room number and ETA of transport to [facility]).       Donald Healy RN  10/12/23 2307

## 2023-10-12 NOTE — ED NOTES
Attempt to call report to [facility] no answer.       Sheila Romano, JONEL  10/12/23 [id number]

## 2023-10-12 NOTE — ED TRIAGE NOTES
Patient is unable to answer most questions. The only thing writer is able to get an answer to patient is c/o left sided \"numbness\" but says it is pain and right facial droop. MD made aware.

## 2023-10-13 ENCOUNTER — APPOINTMENT (OUTPATIENT)
Facility: HOSPITAL | Age: 63
DRG: 065 | End: 2023-10-13
Payer: MEDICARE

## 2023-10-13 PROBLEM — R53.1 WEAKNESS OF LEFT SIDE OF BODY: Status: ACTIVE | Noted: 2023-10-13

## 2023-10-13 LAB
ANION GAP SERPL CALC-SCNC: 1 MMOL/L (ref 3–18)
BUN SERPL-MCNC: 13 MG/DL (ref 7–18)
BUN/CREAT SERPL: 14 (ref 12–20)
CALCIUM SERPL-MCNC: 8.5 MG/DL (ref 8.5–10.1)
CHLORIDE SERPL-SCNC: 116 MMOL/L (ref 100–111)
CO2 SERPL-SCNC: 27 MMOL/L (ref 21–32)
CREAT SERPL-MCNC: 0.96 MG/DL (ref 0.6–1.3)
CRP SERPL-MCNC: <0.3 MG/DL (ref 0–0.3)
ERYTHROCYTE [SEDIMENTATION RATE] IN BLOOD: 13 MM/HR (ref 0–30)
EST. AVERAGE GLUCOSE BLD GHB EST-MCNC: 100 MG/DL
GLUCOSE BLD STRIP.AUTO-MCNC: 110 MG/DL (ref 70–110)
GLUCOSE BLD STRIP.AUTO-MCNC: 75 MG/DL (ref 70–110)
GLUCOSE BLD STRIP.AUTO-MCNC: 88 MG/DL (ref 70–110)
GLUCOSE SERPL-MCNC: 91 MG/DL (ref 74–99)
HBA1C MFR BLD: 5.1 % (ref 4.2–5.6)
MAGNESIUM SERPL-MCNC: 2.2 MG/DL (ref 1.6–2.6)
PHOSPHATE SERPL-MCNC: 2.7 MG/DL (ref 2.5–4.9)
POTASSIUM SERPL-SCNC: 4.4 MMOL/L (ref 3.5–5.5)
SODIUM SERPL-SCNC: 144 MMOL/L (ref 136–145)

## 2023-10-13 PROCEDURE — 1100000000 HC RM PRIVATE

## 2023-10-13 PROCEDURE — 2580000003 HC RX 258: Performed by: STUDENT IN AN ORGANIZED HEALTH CARE EDUCATION/TRAINING PROGRAM

## 2023-10-13 PROCEDURE — 86140 C-REACTIVE PROTEIN: CPT

## 2023-10-13 PROCEDURE — 97535 SELF CARE MNGMENT TRAINING: CPT

## 2023-10-13 PROCEDURE — 92523 SPEECH SOUND LANG COMPREHEN: CPT

## 2023-10-13 PROCEDURE — 85652 RBC SED RATE AUTOMATED: CPT

## 2023-10-13 PROCEDURE — 6370000000 HC RX 637 (ALT 250 FOR IP): Performed by: PSYCHIATRY & NEUROLOGY

## 2023-10-13 PROCEDURE — 6370000000 HC RX 637 (ALT 250 FOR IP): Performed by: HOSPITALIST

## 2023-10-13 PROCEDURE — 84100 ASSAY OF PHOSPHORUS: CPT

## 2023-10-13 PROCEDURE — 97116 GAIT TRAINING THERAPY: CPT

## 2023-10-13 PROCEDURE — 94640 AIRWAY INHALATION TREATMENT: CPT

## 2023-10-13 PROCEDURE — 99223 1ST HOSP IP/OBS HIGH 75: CPT | Performed by: PSYCHIATRY & NEUROLOGY

## 2023-10-13 PROCEDURE — 6360000002 HC RX W HCPCS: Performed by: HOSPITALIST

## 2023-10-13 PROCEDURE — 80048 BASIC METABOLIC PNL TOTAL CA: CPT

## 2023-10-13 PROCEDURE — 72148 MRI LUMBAR SPINE W/O DYE: CPT

## 2023-10-13 PROCEDURE — 92507 TX SP LANG VOICE COMM INDIV: CPT

## 2023-10-13 PROCEDURE — 92610 EVALUATE SWALLOWING FUNCTION: CPT

## 2023-10-13 PROCEDURE — 83735 ASSAY OF MAGNESIUM: CPT

## 2023-10-13 PROCEDURE — 94761 N-INVAS EAR/PLS OXIMETRY MLT: CPT

## 2023-10-13 PROCEDURE — 2580000003 HC RX 258: Performed by: HOSPITALIST

## 2023-10-13 PROCEDURE — 72146 MRI CHEST SPINE W/O DYE: CPT

## 2023-10-13 PROCEDURE — 72141 MRI NECK SPINE W/O DYE: CPT

## 2023-10-13 PROCEDURE — 36415 COLL VENOUS BLD VENIPUNCTURE: CPT

## 2023-10-13 PROCEDURE — 1100000003 HC PRIVATE W/ TELEMETRY

## 2023-10-13 PROCEDURE — 99232 SBSQ HOSP IP/OBS MODERATE 35: CPT | Performed by: HOSPITALIST

## 2023-10-13 PROCEDURE — A9577 INJ MULTIHANCE: HCPCS | Performed by: PSYCHIATRY & NEUROLOGY

## 2023-10-13 PROCEDURE — 6360000004 HC RX CONTRAST MEDICATION: Performed by: PSYCHIATRY & NEUROLOGY

## 2023-10-13 PROCEDURE — 6360000002 HC RX W HCPCS: Performed by: STUDENT IN AN ORGANIZED HEALTH CARE EDUCATION/TRAINING PROGRAM

## 2023-10-13 PROCEDURE — 70553 MRI BRAIN STEM W/O & W/DYE: CPT

## 2023-10-13 PROCEDURE — 2700000000 HC OXYGEN THERAPY PER DAY

## 2023-10-13 PROCEDURE — 97161 PT EVAL LOW COMPLEX 20 MIN: CPT

## 2023-10-13 PROCEDURE — 6370000000 HC RX 637 (ALT 250 FOR IP): Performed by: STUDENT IN AN ORGANIZED HEALTH CARE EDUCATION/TRAINING PROGRAM

## 2023-10-13 PROCEDURE — 97166 OT EVAL MOD COMPLEX 45 MIN: CPT

## 2023-10-13 PROCEDURE — 82962 GLUCOSE BLOOD TEST: CPT

## 2023-10-13 RX ORDER — GAUZE BANDAGE 2" X 2"
100 BANDAGE TOPICAL DAILY
Status: DISCONTINUED | OUTPATIENT
Start: 2023-10-13 | End: 2023-10-16 | Stop reason: HOSPADM

## 2023-10-13 RX ORDER — SODIUM CHLORIDE 450 MG/100ML
INJECTION, SOLUTION INTRAVENOUS CONTINUOUS
Status: DISPENSED | OUTPATIENT
Start: 2023-10-13 | End: 2023-10-14

## 2023-10-13 RX ADMIN — IRON SUCROSE 200 MG: 20 INJECTION, SOLUTION INTRAVENOUS at 11:12

## 2023-10-13 RX ADMIN — ACETAMINOPHEN 1000 MG: 500 TABLET ORAL at 08:50

## 2023-10-13 RX ADMIN — BUDESONIDE 500 MCG: 0.5 SUSPENSION RESPIRATORY (INHALATION) at 20:42

## 2023-10-13 RX ADMIN — ARFORMOTEROL TARTRATE 15 MCG: 15 SOLUTION RESPIRATORY (INHALATION) at 07:05

## 2023-10-13 RX ADMIN — VENLAFAXINE HYDROCHLORIDE 75 MG: 75 CAPSULE, EXTENDED RELEASE ORAL at 08:51

## 2023-10-13 RX ADMIN — AMLODIPINE BESYLATE 5 MG: 5 TABLET ORAL at 08:51

## 2023-10-13 RX ADMIN — SODIUM CHLORIDE, PRESERVATIVE FREE 10 ML: 5 INJECTION INTRAVENOUS at 21:09

## 2023-10-13 RX ADMIN — ENOXAPARIN SODIUM 40 MG: 100 INJECTION SUBCUTANEOUS at 21:09

## 2023-10-13 RX ADMIN — ASPIRIN 81 MG: 81 TABLET, CHEWABLE ORAL at 08:51

## 2023-10-13 RX ADMIN — ARFORMOTEROL TARTRATE 15 MCG: 15 SOLUTION RESPIRATORY (INHALATION) at 20:42

## 2023-10-13 RX ADMIN — CETIRIZINE HYDROCHLORIDE 10 MG: 10 TABLET, FILM COATED ORAL at 08:51

## 2023-10-13 RX ADMIN — TOPIRAMATE 100 MG: 100 TABLET, FILM COATED ORAL at 08:51

## 2023-10-13 RX ADMIN — ATORVASTATIN CALCIUM 80 MG: 40 TABLET, FILM COATED ORAL at 21:08

## 2023-10-13 RX ADMIN — MIRTAZAPINE 15 MG: 15 TABLET, FILM COATED ORAL at 21:08

## 2023-10-13 RX ADMIN — Medication 100 MG: at 11:12

## 2023-10-13 RX ADMIN — BUDESONIDE 500 MCG: 0.5 SUSPENSION RESPIRATORY (INHALATION) at 07:05

## 2023-10-13 RX ADMIN — SODIUM CHLORIDE, PRESERVATIVE FREE 10 ML: 5 INJECTION INTRAVENOUS at 10:44

## 2023-10-13 RX ADMIN — CLOPIDOGREL BISULFATE 75 MG: 75 TABLET ORAL at 08:51

## 2023-10-13 RX ADMIN — TOPIRAMATE 150 MG: 100 TABLET, FILM COATED ORAL at 21:08

## 2023-10-13 RX ADMIN — SODIUM CHLORIDE: 4.5 INJECTION, SOLUTION INTRAVENOUS at 18:53

## 2023-10-13 RX ADMIN — GADOBENATE DIMEGLUMINE 15 ML: 529 INJECTION, SOLUTION INTRAVENOUS at 17:51

## 2023-10-13 RX ADMIN — PANTOPRAZOLE SODIUM 40 MG: 40 TABLET, DELAYED RELEASE ORAL at 05:59

## 2023-10-13 RX ADMIN — FERROUS SULFATE TAB 325 MG (65 MG ELEMENTAL FE) 325 MG: 325 (65 FE) TAB at 08:51

## 2023-10-13 ASSESSMENT — PAIN DESCRIPTION - DESCRIPTORS: DESCRIPTORS: SHARP

## 2023-10-13 ASSESSMENT — PAIN SCALES - GENERAL
PAINLEVEL_OUTOF10: 0
PAINLEVEL_OUTOF10: 6

## 2023-10-13 ASSESSMENT — PAIN DESCRIPTION - ORIENTATION: ORIENTATION: ANTERIOR;LEFT

## 2023-10-13 ASSESSMENT — PAIN DESCRIPTION - LOCATION: LOCATION: HEAD

## 2023-10-13 NOTE — ACP (ADVANCE CARE PLANNING)
Advance Care Planning     Advance Care Planning Inpatient Note  [facility] Department    Today's Date: 10/13/2023  Unit: [address] [facility] request from rounding. Upon review of chart and communication with care team, patient's decision making abilities are not in question. . Patient was/were present in the room during visit. Goals of ACP Conversation:  Discuss advance care planning documents  Facilitate a discussion related to patient's goals of care as they align with the patient's values and beliefs. Health Care Decision Makers:     No healthcare decision makers have been documented. Click here to complete [address] including selection of the Healthcare Decision Maker Relationship (ie \"Primary\")  Summary:  Documented Next of Kin, per patient report  Patient lives currently with  Bette Abraham (daughter) whom patient acknowledges as her NOK. Advance Care Planning Documents (Patient Wishes):  None     Assessment:  Patient had hesitation to complete an Advance Medical Directive at this time but glad to be able to read and review the provided [address] Form. Encouraged to ask assistance from Chaplains when she decides to do it. Interventions:  Reviewed but did not complete ACP document    Care Preferences Communicated:   No    Outcomes/Plan:  ACP Discussion: Completed  Patient deferred executing AMD at this time.     Electronically signed by Aurelia Philippe [facility] on 10/13/2023 at 2:04 PM

## 2023-10-13 NOTE — H&P
Collection Time: 10/12/23  2:54 PM   Result Value Ref Range    Sodium 146 (H) 136 - 145 mmol/L    Potassium 3.6 3.5 - 5.5 mmol/L    Chloride 120 (H) 100 - 111 mmol/L    CO2 22 21 - 32 mmol/L    Anion Gap 4 3.0 - 18 mmol/L    Glucose 141 (H) 74 - 99 mg/dL    BUN 21 (H) 7.0 - 18 MG/DL    Creatinine 0.94 0.6 - 1.3 MG/DL    Bun/Cre Ratio 22 (H) 12 - 20      Est, Glom Filt Rate >60 >60 ml/min/1.73m2    Calcium 8.0 (L) 8.5 - 10.1 MG/DL    Total Bilirubin 0.5 0.2 - 1.0 MG/DL    ALT 25 13 - 56 U/L    AST 19 10 - 38 U/L    Alk Phosphatase 77 45 - 117 U/L    Total Protein 7.4 6.4 - 8.2 g/dL    Albumin 3.5 3.4 - 5.0 g/dL    Globulin 3.9 2.0 - 4.0 g/dL    Albumin/Globulin Ratio 0.9 0.8 - 1.7     Protime-INR    Collection Time: 10/12/23  2:54 PM   Result Value Ref Range    Protime 12.7 11.9 - 14.7 sec    INR 0.9 0.9 - 1.1     Troponin    Collection Time: 10/12/23  2:54 PM   Result Value Ref Range    Troponin, High Sensitivity 20 0 - 54 ng/L   APTT    Collection Time: 10/12/23  2:54 PM   Result Value Ref Range    PTT 29.1 23.0 - 36.4 SEC   TSH    Collection Time: 10/12/23  2:54 PM   Result Value Ref Range    TSH, 3RD GENERATION 0.42 0.36 - 3.74 uIU/mL   Magnesium    Collection Time: 10/12/23  2:54 PM   Result Value Ref Range    Magnesium 2.0 1.6 - 2.6 mg/dL   Vitamin B12 & Folate    Collection Time: 10/12/23  2:54 PM   Result Value Ref Range    Vitamin B-12 615 211 - 911 pg/mL    Folate >20.0 (H) 3.10 - 17.50 ng/mL   POCT Glucose    Collection Time: 10/12/23  2:58 PM   Result Value Ref Range    Glucose 150 mg/dL    QC OK? y    EKG 12 Lead    Collection Time: 10/12/23  3:14 PM   Result Value Ref Range    Ventricular Rate 66 BPM    Atrial Rate 66 BPM    P-R Interval 150 ms    QRS Duration 94 ms    Q-T Interval 422 ms    QTc Calculation (Bazett) 442 ms    P Axis 38 degrees    R Axis 20 degrees    T Axis 54 degrees    Diagnosis       Normal sinus rhythm  Normal ECG  When compared with ECG of 31-AUG-2023 12:07,  No significant

## 2023-10-13 NOTE — CARE COORDINATION
Case Management Assessment  Initial Evaluation    Date/Time of Evaluation: 10/13/2023 3:44 PM  Assessment Completed by: Marcel Mendez    If patient is discharged prior to next notation, then this note serves as note for discharge by case management. Patient Name: Michaela Ferreira                   YOB: 1960  Diagnosis: Expressive aphasia [R47.01]  Nonintractable headache, unspecified chronicity pattern, unspecified headache type [R51.9]  Ischemic stroke ([address]) [I63.9]  TIA (transient ischemic attack) [G45.9]                   Date / Time: 10/12/2023  2:47 PM    Patient Admission Status: Observation   Readmission Risk (Low < 19, Mod (19-27), High > 27): Readmission Risk Score: 15.5    Current PCP: Mandy Killian MD  PCP verified by CM? (P) Yes    Chart Reviewed: Yes      History Provided by: (P) Patient  Patient Orientation: (P) Alert and Oriented    Patient Cognition: (P) Alert    Hospitalization in the last 30 days (Readmission):  No    If yes, Readmission Assessment in  Navigator will be completed. Advance Directives:      Code Status: Full Code   Patient's Primary Decision Maker is: (P) Legal Next of Kin    Preferred Pharmacy: [facility]       Discharge Planning:    Patient lives with: Spouse/Significant Other Type of Home: (P) Apartment  Primary Care Giver: (P) Self  Patient Support Systems include: (P) Children, Family Members, /   Current Financial resources: (P) Medicaid, Medicare  Current community resources: (P) Transportation  Current services prior to admission: None            Current DME:              Type of Home Care services:  (P) OT, PT    ADLS  Prior functional level: (P) Independent in ADLs/IADLs  Current functional level: (P) Independent in ADLs/IADLs    PT AM-PAC: 19 /24  OT AM-PAC: 20 /24    Family can provide assistance at DC: (P) Yes  Would you like Case Management to discuss the discharge plan with any other family

## 2023-10-13 NOTE — CONSULTS
[facility]  Department of Neurology  Chief Complaint   Patient presents with    Facial Droop       HPI:   70 F h/o HTN, HLD has been admitted for severe sharp pain and difficulty speaking. On the admission day, patient was at home, watching TV. Suddenly she had severe sharp pain around left periorbital area, with difficulty speaking. She was unable to look at left side. There was numbness and weakness at left limbs. Her daughter came home, and found patient was unable to talk. She was sent to hospital, where CT/CTA negative for acute bleed or LVO, Started on plavix, ASA, and high intensity statin. She felt better after overnight hospitalization.  The headache is reduced from 10/10 to 6/10, there is no vision disturbance or vision loss       Past Medical History:   Diagnosis Date    Acid reflux     Aortic valve insufficiency     Bipolar 1 disorder (HCC)     Depression     Hypertension     Pneumonia     Renal cell carcinoma (HCC)        Past Surgical History:   Procedure Laterality Date    KIDNEY REMOVAL         Current Facility-Administered Medications   Medication Dose Route Frequency Provider Last Rate Last Admin    sodium chloride flush 0.9 % injection 5-40 mL  5-40 mL IntraVENous 2 times per day Russell John MD   10 mL at 10/12/23 2140    sodium chloride flush 0.9 % injection 5-40 mL  5-40 mL IntraVENous PRN Russell John MD        0.9 % sodium chloride infusion   IntraVENous PRN Russell John MD        enoxaparin (LOVENOX) injection 40 mg  40 mg SubCUTAneous Q24H Russell John MD   40 mg at 10/12/23 2135    ondansetron (ZOFRAN-ODT) disintegrating tablet 4 mg  4 mg Oral Q8H PRN Russell John MD        Or    ondansetron (ZOFRAN) injection 4 mg  4 mg IntraVENous Q6H PRN Russell John MD        polyethylene glycol (GLYCOLAX) packet 17 g  17 g Oral Daily PRN Russell John MD        aspirin chewable tablet 81 mg  81 mg Oral Daily Russell John MD

## 2023-10-14 PROBLEM — G43.509 PERSISTENT MIGRAINE AURA WITHOUT CEREBRAL INFARCTION AND WITHOUT STATUS MIGRAINOSUS, NOT INTRACTABLE: Status: ACTIVE | Noted: 2023-10-14

## 2023-10-14 PROCEDURE — 99233 SBSQ HOSP IP/OBS HIGH 50: CPT | Performed by: INTERNAL MEDICINE

## 2023-10-14 PROCEDURE — 94761 N-INVAS EAR/PLS OXIMETRY MLT: CPT

## 2023-10-14 PROCEDURE — 2580000003 HC RX 258: Performed by: STUDENT IN AN ORGANIZED HEALTH CARE EDUCATION/TRAINING PROGRAM

## 2023-10-14 PROCEDURE — 99233 SBSQ HOSP IP/OBS HIGH 50: CPT | Performed by: PSYCHIATRY & NEUROLOGY

## 2023-10-14 PROCEDURE — 6370000000 HC RX 637 (ALT 250 FOR IP): Performed by: HOSPITALIST

## 2023-10-14 PROCEDURE — 94640 AIRWAY INHALATION TREATMENT: CPT

## 2023-10-14 PROCEDURE — 6370000000 HC RX 637 (ALT 250 FOR IP): Performed by: STUDENT IN AN ORGANIZED HEALTH CARE EDUCATION/TRAINING PROGRAM

## 2023-10-14 PROCEDURE — 6360000002 HC RX W HCPCS: Performed by: STUDENT IN AN ORGANIZED HEALTH CARE EDUCATION/TRAINING PROGRAM

## 2023-10-14 PROCEDURE — 6370000000 HC RX 637 (ALT 250 FOR IP): Performed by: PSYCHIATRY & NEUROLOGY

## 2023-10-14 PROCEDURE — 1100000000 HC RM PRIVATE

## 2023-10-14 PROCEDURE — 2700000000 HC OXYGEN THERAPY PER DAY

## 2023-10-14 PROCEDURE — 1100000003 HC PRIVATE W/ TELEMETRY

## 2023-10-14 RX ADMIN — MIRTAZAPINE 15 MG: 15 TABLET, FILM COATED ORAL at 22:07

## 2023-10-14 RX ADMIN — BUDESONIDE 500 MCG: 0.5 SUSPENSION RESPIRATORY (INHALATION) at 07:29

## 2023-10-14 RX ADMIN — ARFORMOTEROL TARTRATE 15 MCG: 15 SOLUTION RESPIRATORY (INHALATION) at 20:17

## 2023-10-14 RX ADMIN — CETIRIZINE HYDROCHLORIDE 10 MG: 10 TABLET, FILM COATED ORAL at 08:47

## 2023-10-14 RX ADMIN — ATORVASTATIN CALCIUM 80 MG: 40 TABLET, FILM COATED ORAL at 22:07

## 2023-10-14 RX ADMIN — BUDESONIDE 500 MCG: 0.5 SUSPENSION RESPIRATORY (INHALATION) at 20:17

## 2023-10-14 RX ADMIN — Medication 100 MG: at 08:47

## 2023-10-14 RX ADMIN — TOPIRAMATE 150 MG: 100 TABLET, FILM COATED ORAL at 08:47

## 2023-10-14 RX ADMIN — ASPIRIN 81 MG: 81 TABLET, CHEWABLE ORAL at 08:47

## 2023-10-14 RX ADMIN — ACETAMINOPHEN 1000 MG: 500 TABLET ORAL at 16:09

## 2023-10-14 RX ADMIN — SODIUM CHLORIDE, PRESERVATIVE FREE 10 ML: 5 INJECTION INTRAVENOUS at 08:50

## 2023-10-14 RX ADMIN — PANTOPRAZOLE SODIUM 40 MG: 40 TABLET, DELAYED RELEASE ORAL at 06:38

## 2023-10-14 RX ADMIN — TOPIRAMATE 150 MG: 100 TABLET, FILM COATED ORAL at 22:08

## 2023-10-14 RX ADMIN — FERROUS SULFATE TAB 325 MG (65 MG ELEMENTAL FE) 325 MG: 325 (65 FE) TAB at 08:47

## 2023-10-14 RX ADMIN — SODIUM CHLORIDE, PRESERVATIVE FREE 10 ML: 5 INJECTION INTRAVENOUS at 22:08

## 2023-10-14 RX ADMIN — ARFORMOTEROL TARTRATE 15 MCG: 15 SOLUTION RESPIRATORY (INHALATION) at 07:29

## 2023-10-14 RX ADMIN — CLOPIDOGREL BISULFATE 75 MG: 75 TABLET ORAL at 08:47

## 2023-10-14 RX ADMIN — VENLAFAXINE HYDROCHLORIDE 75 MG: 75 CAPSULE, EXTENDED RELEASE ORAL at 08:47

## 2023-10-14 RX ADMIN — ENOXAPARIN SODIUM 40 MG: 100 INJECTION SUBCUTANEOUS at 22:06

## 2023-10-14 ASSESSMENT — PAIN DESCRIPTION - DESCRIPTORS: DESCRIPTORS: ACHING

## 2023-10-14 ASSESSMENT — PAIN SCALES - GENERAL
PAINLEVEL_OUTOF10: 7
PAINLEVEL_OUTOF10: 0

## 2023-10-14 ASSESSMENT — PAIN DESCRIPTION - LOCATION: LOCATION: HEAD

## 2023-10-15 LAB — HBA1C MFR BLD: 4.9 % (ref 4.2–5.6)

## 2023-10-15 PROCEDURE — 6370000000 HC RX 637 (ALT 250 FOR IP): Performed by: PSYCHIATRY & NEUROLOGY

## 2023-10-15 PROCEDURE — 83036 HEMOGLOBIN GLYCOSYLATED A1C: CPT

## 2023-10-15 PROCEDURE — 2580000003 HC RX 258: Performed by: STUDENT IN AN ORGANIZED HEALTH CARE EDUCATION/TRAINING PROGRAM

## 2023-10-15 PROCEDURE — 94640 AIRWAY INHALATION TREATMENT: CPT

## 2023-10-15 PROCEDURE — 99232 SBSQ HOSP IP/OBS MODERATE 35: CPT | Performed by: INTERNAL MEDICINE

## 2023-10-15 PROCEDURE — 6370000000 HC RX 637 (ALT 250 FOR IP): Performed by: STUDENT IN AN ORGANIZED HEALTH CARE EDUCATION/TRAINING PROGRAM

## 2023-10-15 PROCEDURE — 36415 COLL VENOUS BLD VENIPUNCTURE: CPT

## 2023-10-15 PROCEDURE — 6360000002 HC RX W HCPCS: Performed by: STUDENT IN AN ORGANIZED HEALTH CARE EDUCATION/TRAINING PROGRAM

## 2023-10-15 PROCEDURE — 94761 N-INVAS EAR/PLS OXIMETRY MLT: CPT

## 2023-10-15 PROCEDURE — 1100000003 HC PRIVATE W/ TELEMETRY

## 2023-10-15 PROCEDURE — 6370000000 HC RX 637 (ALT 250 FOR IP): Performed by: HOSPITALIST

## 2023-10-15 PROCEDURE — 1100000000 HC RM PRIVATE

## 2023-10-15 RX ADMIN — ENOXAPARIN SODIUM 40 MG: 100 INJECTION SUBCUTANEOUS at 21:39

## 2023-10-15 RX ADMIN — CETIRIZINE HYDROCHLORIDE 10 MG: 10 TABLET, FILM COATED ORAL at 10:21

## 2023-10-15 RX ADMIN — ARFORMOTEROL TARTRATE 15 MCG: 15 SOLUTION RESPIRATORY (INHALATION) at 20:40

## 2023-10-15 RX ADMIN — TOPIRAMATE 150 MG: 100 TABLET, FILM COATED ORAL at 21:39

## 2023-10-15 RX ADMIN — BUDESONIDE 500 MCG: 0.5 SUSPENSION RESPIRATORY (INHALATION) at 09:00

## 2023-10-15 RX ADMIN — ATORVASTATIN CALCIUM 80 MG: 40 TABLET, FILM COATED ORAL at 21:40

## 2023-10-15 RX ADMIN — SODIUM CHLORIDE, PRESERVATIVE FREE 10 ML: 5 INJECTION INTRAVENOUS at 21:41

## 2023-10-15 RX ADMIN — FERROUS SULFATE TAB 325 MG (65 MG ELEMENTAL FE) 325 MG: 325 (65 FE) TAB at 10:22

## 2023-10-15 RX ADMIN — TOPIRAMATE 150 MG: 100 TABLET, FILM COATED ORAL at 10:20

## 2023-10-15 RX ADMIN — CLOPIDOGREL BISULFATE 75 MG: 75 TABLET ORAL at 10:22

## 2023-10-15 RX ADMIN — PANTOPRAZOLE SODIUM 40 MG: 40 TABLET, DELAYED RELEASE ORAL at 06:23

## 2023-10-15 RX ADMIN — POLYETHYLENE GLYCOL 3350 17 G: 17 POWDER, FOR SOLUTION ORAL at 10:28

## 2023-10-15 RX ADMIN — BUDESONIDE 500 MCG: 0.5 SUSPENSION RESPIRATORY (INHALATION) at 20:40

## 2023-10-15 RX ADMIN — VENLAFAXINE HYDROCHLORIDE 75 MG: 75 CAPSULE, EXTENDED RELEASE ORAL at 10:20

## 2023-10-15 RX ADMIN — ASPIRIN 81 MG: 81 TABLET, CHEWABLE ORAL at 10:20

## 2023-10-15 RX ADMIN — MIRTAZAPINE 15 MG: 15 TABLET, FILM COATED ORAL at 21:40

## 2023-10-15 RX ADMIN — Medication 100 MG: at 10:22

## 2023-10-15 RX ADMIN — ACETAMINOPHEN 1000 MG: 500 TABLET ORAL at 10:25

## 2023-10-15 RX ADMIN — ARFORMOTEROL TARTRATE 15 MCG: 15 SOLUTION RESPIRATORY (INHALATION) at 09:00

## 2023-10-15 ASSESSMENT — PAIN SCALES - GENERAL
PAINLEVEL_OUTOF10: 0
PAINLEVEL_OUTOF10: 7
PAINLEVEL_OUTOF10: 7
PAINLEVEL_OUTOF10: 0
PAINLEVEL_OUTOF10: 0

## 2023-10-15 ASSESSMENT — PAIN DESCRIPTION - LOCATION: LOCATION: HEAD

## 2023-10-15 ASSESSMENT — PAIN DESCRIPTION - DESCRIPTORS: DESCRIPTORS: ACHING;THROBBING

## 2023-10-15 ASSESSMENT — PAIN DESCRIPTION - DIRECTION: RADIATING_TOWARDS: /

## 2023-10-15 ASSESSMENT — PAIN DESCRIPTION - ORIENTATION: ORIENTATION: LEFT

## 2023-10-15 ASSESSMENT — PAIN SCALES - WONG BAKER: WONGBAKER_NUMERICALRESPONSE: 4

## 2023-10-16 ENCOUNTER — HOSPITAL ENCOUNTER (OUTPATIENT)
Facility: HOSPITAL | Age: 63
Setting detail: RECURRING SERIES
End: 2023-10-16
Payer: MEDICARE

## 2023-10-16 ENCOUNTER — APPOINTMENT (OUTPATIENT)
Facility: HOSPITAL | Age: 63
DRG: 065 | End: 2023-10-16
Attending: INTERNAL MEDICINE
Payer: MEDICARE

## 2023-10-16 VITALS
BODY MASS INDEX: 33.9 KG/M2 | RESPIRATION RATE: 17 BRPM | WEIGHT: 216 LBS | SYSTOLIC BLOOD PRESSURE: 148 MMHG | HEART RATE: 72 BPM | TEMPERATURE: 98.4 F | OXYGEN SATURATION: 97 % | HEIGHT: 67 IN | DIASTOLIC BLOOD PRESSURE: 69 MMHG

## 2023-10-16 LAB
ECHO AO ASC DIAM: 2.9 CM
ECHO AO ASCENDING AORTA INDEX: 1.39 CM/M2
ECHO AO ROOT DIAM: 2.7 CM
ECHO AO ROOT INDEX: 1.29 CM/M2
ECHO AV AREA PEAK VELOCITY: 1.1 CM2
ECHO AV AREA VTI: 1.3 CM2
ECHO AV AREA/BSA PEAK VELOCITY: 0.5 CM2/M2
ECHO AV AREA/BSA VTI: 0.6 CM2/M2
ECHO AV MEAN GRADIENT: 24 MMHG
ECHO AV MEAN VELOCITY: 2.3 M/S
ECHO AV PEAK GRADIENT: 40 MMHG
ECHO AV PEAK VELOCITY: 3.2 M/S
ECHO AV VELOCITY RATIO: 0.44
ECHO AV VTI: 62.2 CM
ECHO BSA: 2.15 M2
ECHO LA VOL 2C: 115 ML (ref 22–52)
ECHO LA VOL 4C: 86 ML (ref 22–52)
ECHO LA VOL BP: 90 ML (ref 22–52)
ECHO LA VOL/BSA BIPLANE: 43 ML/M2 (ref 16–34)
ECHO LA VOLUME AREA LENGTH: 102 ML
ECHO LA VOLUME INDEX A2C: 55 ML/M2 (ref 16–34)
ECHO LA VOLUME INDEX A4C: 41 ML/M2 (ref 16–34)
ECHO LA VOLUME INDEX AREA LENGTH: 49 ML/M2 (ref 16–34)
ECHO LV E' SEPTAL VELOCITY: 7 CM/S
ECHO LV FRACTIONAL SHORTENING: 36 % (ref 28–44)
ECHO LV INTERNAL DIMENSION DIASTOLE INDEX: 1.58 CM/M2
ECHO LV INTERNAL DIMENSION DIASTOLIC: 3.3 CM (ref 3.9–5.3)
ECHO LV INTERNAL DIMENSION SYSTOLIC INDEX: 1 CM/M2
ECHO LV INTERNAL DIMENSION SYSTOLIC: 2.1 CM
ECHO LV IVSD: 1.1 CM (ref 0.6–0.9)
ECHO LV MASS 2D: 94.6 G (ref 67–162)
ECHO LV MASS INDEX 2D: 45.2 G/M2 (ref 43–95)
ECHO LV POSTERIOR WALL DIASTOLIC: 0.9 CM (ref 0.6–0.9)
ECHO LV RELATIVE WALL THICKNESS RATIO: 0.55
ECHO LVOT AREA: 2.5 CM2
ECHO LVOT AV VTI INDEX: 0.5
ECHO LVOT DIAM: 1.8 CM
ECHO LVOT MEAN GRADIENT: 4 MMHG
ECHO LVOT PEAK GRADIENT: 8 MMHG
ECHO LVOT PEAK VELOCITY: 1.4 M/S
ECHO LVOT STROKE VOLUME INDEX: 38.2 ML/M2
ECHO LVOT SV: 79.9 ML
ECHO LVOT VTI: 31.4 CM
ECHO MV A VELOCITY: 1.4 M/S
ECHO MV AREA PHT: 3.4 CM2
ECHO MV E DECELERATION TIME (DT): 280.5 MS
ECHO MV E VELOCITY: 0.81 M/S
ECHO MV E/A RATIO: 0.58
ECHO MV E/E' SEPTAL: 11.57
ECHO MV PRESSURE HALF TIME (PHT): 65.3 MS
ECHO RA END SYSTOLIC VOLUME APICAL 4 CHAMBER INDEX BSA: 14 ML/M2
ECHO RA VOLUME: 30 ML
ECHO RV TAPSE: 2 CM (ref 1.7–?)
ECHO TV REGURGITANT MAX VELOCITY: 2.43 M/S
ECHO TV REGURGITANT PEAK GRADIENT: 24 MMHG

## 2023-10-16 PROCEDURE — 94761 N-INVAS EAR/PLS OXIMETRY MLT: CPT

## 2023-10-16 PROCEDURE — 6360000002 HC RX W HCPCS: Performed by: STUDENT IN AN ORGANIZED HEALTH CARE EDUCATION/TRAINING PROGRAM

## 2023-10-16 PROCEDURE — 6370000000 HC RX 637 (ALT 250 FOR IP): Performed by: HOSPITALIST

## 2023-10-16 PROCEDURE — 6370000000 HC RX 637 (ALT 250 FOR IP): Performed by: STUDENT IN AN ORGANIZED HEALTH CARE EDUCATION/TRAINING PROGRAM

## 2023-10-16 PROCEDURE — 2580000003 HC RX 258: Performed by: STUDENT IN AN ORGANIZED HEALTH CARE EDUCATION/TRAINING PROGRAM

## 2023-10-16 PROCEDURE — 6370000000 HC RX 637 (ALT 250 FOR IP): Performed by: INTERNAL MEDICINE

## 2023-10-16 PROCEDURE — 99239 HOSP IP/OBS DSCHRG MGMT >30: CPT | Performed by: INTERNAL MEDICINE

## 2023-10-16 PROCEDURE — 94640 AIRWAY INHALATION TREATMENT: CPT

## 2023-10-16 PROCEDURE — 93306 TTE W/DOPPLER COMPLETE: CPT

## 2023-10-16 PROCEDURE — 6370000000 HC RX 637 (ALT 250 FOR IP): Performed by: PSYCHIATRY & NEUROLOGY

## 2023-10-16 PROCEDURE — 93306 TTE W/DOPPLER COMPLETE: CPT | Performed by: INTERNAL MEDICINE

## 2023-10-16 RX ORDER — ASPIRIN 81 MG/1
81 TABLET, CHEWABLE ORAL DAILY
Qty: 30 TABLET | Refills: 3 | Status: SHIPPED | OUTPATIENT
Start: 2023-10-16

## 2023-10-16 RX ORDER — ATORVASTATIN CALCIUM 80 MG/1
80 TABLET, FILM COATED ORAL NIGHTLY
Qty: 30 TABLET | Refills: 3 | Status: SHIPPED | OUTPATIENT
Start: 2023-10-16

## 2023-10-16 RX ORDER — PANTOPRAZOLE SODIUM 40 MG/1
40 TABLET, DELAYED RELEASE ORAL
Qty: 30 TABLET | Refills: 3 | Status: SHIPPED | OUTPATIENT
Start: 2023-10-17

## 2023-10-16 RX ORDER — MIRTAZAPINE 15 MG/1
15 TABLET, FILM COATED ORAL NIGHTLY
Qty: 30 TABLET | Refills: 3 | Status: SHIPPED | OUTPATIENT
Start: 2023-10-16

## 2023-10-16 RX ORDER — AMLODIPINE BESYLATE 5 MG/1
5 TABLET ORAL DAILY
Qty: 30 TABLET | Refills: 3 | Status: SHIPPED | OUTPATIENT
Start: 2023-10-16

## 2023-10-16 RX ORDER — AMLODIPINE BESYLATE 5 MG/1
5 TABLET ORAL DAILY
Status: DISCONTINUED | OUTPATIENT
Start: 2023-10-16 | End: 2023-10-16 | Stop reason: HOSPADM

## 2023-10-16 RX ORDER — FERROUS SULFATE 325(65) MG
325 TABLET ORAL
Qty: 30 TABLET | Refills: 3 | Status: SHIPPED | OUTPATIENT
Start: 2023-10-17

## 2023-10-16 RX ORDER — CLOPIDOGREL BISULFATE 75 MG/1
75 TABLET ORAL DAILY
Qty: 30 TABLET | Refills: 3 | Status: SHIPPED | OUTPATIENT
Start: 2023-10-17

## 2023-10-16 RX ADMIN — VENLAFAXINE HYDROCHLORIDE 75 MG: 75 CAPSULE, EXTENDED RELEASE ORAL at 08:57

## 2023-10-16 RX ADMIN — FERROUS SULFATE TAB 325 MG (65 MG ELEMENTAL FE) 325 MG: 325 (65 FE) TAB at 08:57

## 2023-10-16 RX ADMIN — SODIUM CHLORIDE, PRESERVATIVE FREE 10 ML: 5 INJECTION INTRAVENOUS at 09:04

## 2023-10-16 RX ADMIN — TOPIRAMATE 150 MG: 100 TABLET, FILM COATED ORAL at 08:57

## 2023-10-16 RX ADMIN — CLOPIDOGREL BISULFATE 75 MG: 75 TABLET ORAL at 08:57

## 2023-10-16 RX ADMIN — ARFORMOTEROL TARTRATE 15 MCG: 15 SOLUTION RESPIRATORY (INHALATION) at 07:18

## 2023-10-16 RX ADMIN — PANTOPRAZOLE SODIUM 40 MG: 40 TABLET, DELAYED RELEASE ORAL at 06:35

## 2023-10-16 RX ADMIN — ASPIRIN 81 MG: 81 TABLET, CHEWABLE ORAL at 08:57

## 2023-10-16 RX ADMIN — BUDESONIDE 500 MCG: 0.5 SUSPENSION RESPIRATORY (INHALATION) at 07:18

## 2023-10-16 RX ADMIN — CETIRIZINE HYDROCHLORIDE 10 MG: 10 TABLET, FILM COATED ORAL at 08:57

## 2023-10-16 RX ADMIN — Medication 100 MG: at 08:57

## 2023-10-16 RX ADMIN — AMLODIPINE BESYLATE 5 MG: 5 TABLET ORAL at 13:48

## 2023-10-16 RX ADMIN — ACETAMINOPHEN 1000 MG: 500 TABLET ORAL at 06:35

## 2023-10-16 ASSESSMENT — PAIN SCALES - GENERAL
PAINLEVEL_OUTOF10: 7
PAINLEVEL_OUTOF10: 0

## 2023-10-16 ASSESSMENT — PAIN DESCRIPTION - ORIENTATION: ORIENTATION: LEFT

## 2023-10-16 NOTE — CARE COORDINATION
Discharge order noted for today. Pt has been accepted to [facility] agency for [facility], PT/OT and SPL. Met with patient at bedside, hipaa verified. Patient agreeable to the transition plan today. DME is being supplied by [facility]. Daughter will provide transportation home via personally owned vehicle. Patient's home health  orders have been forwarded to [facility] home health  agency via [id number] chart. Discharge Summary is not available at this time. Updated bedside RN, Abdullahi,  to the transition plan.   Discharge information has been documented on the AVS.

## 2023-10-16 NOTE — CARE COORDINATION
10/16/23 1235   IMM Letter   IMM Letter given to Patient/Family/Significant other/Guardian/POA/by: Cira Aragon RN CM   IMM Letter date given: 10/16/23   IMM Letter time given: 1125

## 2023-10-16 NOTE — DISCHARGE SUMMARY
omeprazole 40 MG delayed release capsule            CHANGE how you take these medications      * albuterol (2.5 MG/3ML) 0.083% nebulizer solution  Commonly known as: PROVENTIL  What changed: Another medication with the same name was removed. Continue taking this medication, and follow the directions you see here. * albuterol sulfate  (90 Base) MCG/ACT inhaler  Commonly known as: PROVENTIL;VENTOLIN;PROAIR  What changed: Another medication with the same name was removed. Continue taking this medication, and follow the directions you see here. amLODIPine 5 MG tablet  Commonly known as: NORVASC  Take 1 tablet by mouth daily  What changed:   medication strength  when to take this  Another medication with the same name was removed. Continue taking this medication, and follow the directions you see here. atorvastatin 80 MG tablet  Commonly known as: LIPITOR  Take 1 tablet by mouth nightly  What changed:   medication strength  how much to take     ferrous sulfate 325 (65 Fe) MG tablet  Commonly known as: IRON 325  Take 1 tablet by mouth daily (with breakfast)  Start taking on: October 17, 2023  What changed: See the new instructions. mirtazapine 15 MG tablet  Commonly known as: REMERON  Take 1 tablet by mouth nightly  What changed:   how much to take  when to take this           * This list has 2 medication(s) that are the same as other medications prescribed for you. Read the directions carefully, and ask your doctor or other care provider to review them with you. CONTINUE taking these medications      aspirin 81 MG chewable tablet  Take 1 tablet by mouth daily     cetirizine 10 MG tablet  Commonly known as: ZYRTEC     fluticasone-salmeterol 250-50 MCG/ACT Aepb diskus inhaler  Commonly known as: Advair Diskus  Inhale 1 puff into the lungs in the morning and 1 puff in the evening.      meclizine 25 MG tablet  Commonly known as: ANTIVERT     nitroGLYCERIN 0.4 MG SL tablet  Commonly known

## 2023-10-17 ENCOUNTER — HOME HEALTH ADMISSION (OUTPATIENT)
Age: 63
End: 2023-10-17
Payer: COMMERCIAL

## 2023-10-17 NOTE — HOME CARE
Late Entry:    Received home health referral for [facility] for (SN, PT, OT, SLP). Discharge order noted for 10/16/23. Spoke with patient in room;  patient identifiers verified. Explained home health care services and routines. Demographics verified including insurance, phone and address confirmed. Patient has the following DME: Currently has no DME available in home prior to hospital admission.   Caregivers available:  resides with dtr locally - and has other family member available to assist.  Orders noted and arranged and sent to central intake and scheduling.      ---   Lorenzo Wynn LPN  [facility] - INPATIENT Liaison

## 2023-10-18 ENCOUNTER — HOME CARE VISIT (OUTPATIENT)
Age: 63
End: 2023-10-18
Payer: COMMERCIAL

## 2023-10-18 ENCOUNTER — APPOINTMENT (OUTPATIENT)
Facility: HOSPITAL | Age: 63
End: 2023-10-18
Payer: MEDICARE

## 2023-10-18 VITALS
TEMPERATURE: 98.6 F | RESPIRATION RATE: 14 BRPM | DIASTOLIC BLOOD PRESSURE: 70 MMHG | OXYGEN SATURATION: 100 % | HEART RATE: 72 BPM | SYSTOLIC BLOOD PRESSURE: 116 MMHG

## 2023-10-18 PROCEDURE — G0151 HHCP-SERV OF PT,EA 15 MIN: HCPCS

## 2023-10-18 PROCEDURE — G0299 HHS/HOSPICE OF RN EA 15 MIN: HCPCS

## 2023-10-18 ASSESSMENT — ENCOUNTER SYMPTOMS
PAIN LOCATION - PAIN QUALITY: PINS/NEEDLES
HEMOPTYSIS: 0
DYSPNEA ACTIVITY LEVEL: AFTER AMBULATING 10 - 20 FT

## 2023-10-18 NOTE — HOME HEALTH
stronger. Patient level of understanding of education provided: Patient and daughter with good understanding. Patient response to procedure performed:  Patient and daughter were engaged and cooperative during [facility] interview. Home exercise program/Homework provided:  Work with other disciplines to increase endurance and strength. I went over the 5 signs of stroke and when to call 911. Needs the assistance of one other for safe ambulation. Take all medications as prescribed and f/u with PCP and specialists as instructed. Pt/Caregiver instructed on plan of care and are agreeable to plan of care at this time. Physician Kelby Borrero notified of patient admission to home health and plan of care including anticipated frequency of visits  and treatments/interventions/modalities of SN/PT/OT/SLP. Discharge planning discussed with patient and caregiver. Discharge planning as follows: Will discharge when education is completed and patient is medically stable. [place] [employer] Pt/Caregiver did verbalize understanding of discharge planning. Next MD appointment  TBD with Kelby Borrero MD.  Patient/caregiver encouraged/instructed to keep appointment as lack of follow through with physician appointment could result in discontinuation of home care services for non-compliance.

## 2023-10-19 NOTE — HOME HEALTH
HPI:  Hospital:  Ms. Cherie Hernandez is a 71 yo F w/ PMHx of HTN, HLD who presented to [facility] ED on 10/12 w/ CC of aphasia. Daughter reported that she left patient around 0800 this morning in her normal self, when she returned home at 1100 she noted her mother had difficulty speaking. Patient also had reported left sided paresthesia & weakness and headache. Notably the symptoms were rapidly waxing/waining over the the course of minutes, however the dy sarthria and left sided paresthesia/weakness have persisted. Dx: CVA with Dysarthria. Patient was admitted to [facility] from 10/12/23 to 10/16/23,  Acute left CVA as per MRI;  Acute nonhemorrhagic left temporal parietal infarction. .   Prior Medical History:   Expressive aphasia  10/12/2023    Ischemic stroke  10/12/2023   TIA (transient ischemic attack) 10/12/2023   Normocytic anemia  HTN (hypertension)    HLD (hyperlipidemia)   Multiple thyroid nodules   History of renal cell cancer  Asthma    Mild cognitive impairment   10/12/2023    Weakness of left side of body 10/13/2023    Persistent migraine aura without cerebral infarction and without status migrainosus, not intractable  10/14/2023   . Subjective:  Patient is a pleasant 70 y.o. female presenting sitting up in kitchen and agreeable to the PT visit. Living /Home Situation: She lives with her daughter in a 2 story home, full bath is on 2nd floor. Patient bedroom is on 1st floor. Patient has a 3 in 1 commode that can be used at bedside at night and in bathroom during the day. Her bed is high and she needs a stepstool to get in. She has a small plastic step stool and PT recommends a larger and safer one, which she reports they have in this home. Caregiver involvement: Her daughter assists in all areas of care and need    PT Evaluation:  Strength:  R LE 4-/5, L hip 2-/5, L knee, ankle 3-/5, see PT ROM for details.   ROM:   BLEs WFL  Transfers: B hand support, Min A from low toilet, SBA from chair, constructed BSC

## 2023-10-20 ENCOUNTER — HOME CARE VISIT (OUTPATIENT)
Age: 63
End: 2023-10-20
Payer: COMMERCIAL

## 2023-10-20 ENCOUNTER — APPOINTMENT (OUTPATIENT)
Facility: HOSPITAL | Age: 63
End: 2023-10-20
Payer: MEDICARE

## 2023-10-20 VITALS
TEMPERATURE: 97 F | HEART RATE: 68 BPM | OXYGEN SATURATION: 99 % | RESPIRATION RATE: 14 BRPM | DIASTOLIC BLOOD PRESSURE: 60 MMHG | SYSTOLIC BLOOD PRESSURE: 140 MMHG

## 2023-10-20 VITALS
SYSTOLIC BLOOD PRESSURE: 140 MMHG | OXYGEN SATURATION: 98 % | DIASTOLIC BLOOD PRESSURE: 80 MMHG | RESPIRATION RATE: 16 BRPM | HEART RATE: 76 BPM | TEMPERATURE: 98.3 F

## 2023-10-20 PROCEDURE — G0157 HHC PT ASSISTANT EA 15: HCPCS

## 2023-10-20 NOTE — HOME HEALTH
Patient's Subjective: She reports doing good. She has been doing the supine exercises, not the seated ones. Falls since last session:  none  Trips to ER since last session? no  Pain/Discomfort ? Yes, see pain page. New Meds: no  Upcoming MD appointments:11/1/2023 9:00 AM    .  Caregiver involvement/assistance needed for: The patient's caregiver assists with meals, chores, transportation  . Home health supplies by type and quantity ordered/delivered this visit include:  none  . Objective:  See interventions. Patient response to treatment:  RPE 7/10 post exercises and amb, sx 7/10 L knee. Patient level of understanding of education provided:  Fair understanding of her exercises and gait with education. Education for safe foot wear with backs and reasoning. She reported understanding. Education for exercises to tolerance. Use sub max ROM to avoid the area where the pain spikes. She reported understanding. Education for throw rugs being unsafe. She pulled one up with walker legs, as she passed the washing machine to go to her bedroom. Assess of progress towards goals:   She denies any falls to date. She was able to participate in her HEP with moderate fatigue at minimal reps. Fair stability amb in the home. Continued need for the following skills:  general strengthening to improve L side strength, gait and balance to decrease fall risk and improve functional Ind. Plan for next visit: Cont with  gait training and progress exercises to tolerance. The following discharge was discussed with the patient/caregiver :  Estimated DC ~ 11/9/2023 by Cherie Marx, PT   [facility] HEART required?  Yes

## 2023-10-23 ENCOUNTER — APPOINTMENT (OUTPATIENT)
Facility: HOSPITAL | Age: 63
End: 2023-10-23
Payer: MEDICARE

## 2023-10-23 ENCOUNTER — HOME CARE VISIT (OUTPATIENT)
Age: 63
End: 2023-10-23
Payer: COMMERCIAL

## 2023-10-23 VITALS
TEMPERATURE: 97.9 F | SYSTOLIC BLOOD PRESSURE: 128 MMHG | DIASTOLIC BLOOD PRESSURE: 80 MMHG | OXYGEN SATURATION: 98 % | HEART RATE: 76 BPM | RESPIRATION RATE: 17 BRPM

## 2023-10-23 PROCEDURE — G0300 HHS/HOSPICE OF LPN EA 15 MIN: HCPCS

## 2023-10-23 PROCEDURE — G0152 HHCP-SERV OF OT,EA 15 MIN: HCPCS

## 2023-10-24 ENCOUNTER — HOME CARE VISIT (OUTPATIENT)
Age: 63
End: 2023-10-24
Payer: COMMERCIAL

## 2023-10-24 VITALS
OXYGEN SATURATION: 99 % | HEART RATE: 76 BPM | TEMPERATURE: 98 F | SYSTOLIC BLOOD PRESSURE: 140 MMHG | DIASTOLIC BLOOD PRESSURE: 74 MMHG

## 2023-10-24 VITALS
RESPIRATION RATE: 16 BRPM | DIASTOLIC BLOOD PRESSURE: 80 MMHG | TEMPERATURE: 97.3 F | SYSTOLIC BLOOD PRESSURE: 120 MMHG | HEART RATE: 74 BPM | OXYGEN SATURATION: 98 %

## 2023-10-24 PROCEDURE — G0157 HHC PT ASSISTANT EA 15: HCPCS

## 2023-10-24 NOTE — HOME HEALTH
Patient's Subjective: She reports compliance with HEP 2x/day. She denies any pain. She reports having to leave town for her f/u with the Cardiologist in a couple of days. Falls since last session:   no  Trips to ER since last session? no  Pain/Discomfort ? no  New Meds: no   Upcoming MD appointments: She leaves the 27 th and is not sure when she is returning. She is having her f/u with the Cardiologist. She reports that she is not sure if she will have to have another procedure. .  Caregiver involvement/assistance needed for: The patient's caregiver assists with meals, chores, transportation    Home health supplies by type and quantity ordered/delivered this visit include:  none   . Objective:  See interventions. Patient response to treatment:  Pt with increased respirations and fatigue post exercises requiring 1 seated rest break RPE 6/10. Patient level of understanding of education provided:  Good for new exercises with the ability to return demonstrate. Good for correcting her gait pattern post education with demonstration    Assess of progress towards goals  Pt progressed from 9/28 to 17/28 this session indicating increased stability and decreased fall risk. She is still a high fall risk. Continued need for the following skills:  general strengthening to improve L side strength, gait and balance to decrease fall risk and improve functional Ind. Plan for next visit: Cont with  gait training and progress exercises to tolerance. The following discharge was discussed with the patient/caregiver :  Estimated DC ~ 11/9/2023 by Virgilio Oakley, PT   [facility] HEART required?  Yes

## 2023-10-25 ENCOUNTER — HOME CARE VISIT (OUTPATIENT)
Age: 63
End: 2023-10-25
Payer: COMMERCIAL

## 2023-10-25 ENCOUNTER — APPOINTMENT (OUTPATIENT)
Facility: HOSPITAL | Age: 63
End: 2023-10-25
Payer: MEDICARE

## 2023-10-25 VITALS
SYSTOLIC BLOOD PRESSURE: 130 MMHG | TEMPERATURE: 97.5 F | OXYGEN SATURATION: 100 % | RESPIRATION RATE: 17 BRPM | DIASTOLIC BLOOD PRESSURE: 73 MMHG | HEART RATE: 79 BPM

## 2023-10-25 VITALS
DIASTOLIC BLOOD PRESSURE: 80 MMHG | RESPIRATION RATE: 16 BRPM | TEMPERATURE: 98 F | OXYGEN SATURATION: 99 % | SYSTOLIC BLOOD PRESSURE: 116 MMHG | HEART RATE: 86 BPM

## 2023-10-25 PROCEDURE — G0158 HHC OT ASSISTANT EA 15: HCPCS

## 2023-10-25 PROCEDURE — G0300 HHS/HOSPICE OF LPN EA 15 MIN: HCPCS

## 2023-10-25 ASSESSMENT — ENCOUNTER SYMPTOMS: PAIN LOCATION - PAIN QUALITY: ACHING

## 2023-10-25 NOTE — HOME HEALTH
a 5/10, it did not affect ability to perform therapy tasks. (Please see pain tab for more details.)   . CONTINUED NEED FOR THE FOLLOWING SKILLS: HH OT is medically necessary to address pain, decreased functional strength, decreased independence and safety with functional transfers, decreased independence and safety performing ADL/IADL tasks, decreased activity and standing tolerance, decreased functional endurance, and impaired balance in order to improve functional independence, obtain set goals, reduce risk of falls, reduce pain, improve quality of life, and return to OF. Vidhya Estrella PLAN FOR NEXT VISIT: Javed Chen will address functional transfers and ADLS   .   THE FOLLOWING DISCHARGE PLANNING WAS DISCUSSED WITH THE PATIENT/CAREGIVER: Tentative d/c 2w3

## 2023-10-26 ENCOUNTER — HOME CARE VISIT (OUTPATIENT)
Age: 63
End: 2023-10-26
Payer: COMMERCIAL

## 2023-10-26 VITALS
HEART RATE: 98 BPM | RESPIRATION RATE: 18 BRPM | SYSTOLIC BLOOD PRESSURE: 120 MMHG | TEMPERATURE: 97.5 F | OXYGEN SATURATION: 97 % | DIASTOLIC BLOOD PRESSURE: 80 MMHG

## 2023-10-26 PROCEDURE — G0157 HHC PT ASSISTANT EA 15: HCPCS

## 2023-10-26 NOTE — HOME HEALTH
Patient's Subjective: She reports being pretty good, but tired. She did not sleep good last night. HEP: Yes, 3 x/day  Falls since last session:  none  Trips to ER since last session? none  Pain/Discomfort ? none  New Meds: none   Upcoming MD appointments: She leaves the 27 th and is not sure when she is returning. She is having her f/u with the Cardiologist. She reports that she is not sure if she will have to have another procedure. Caregiver involvement/assistance needed for: The patient's caregiver assists with meals, chores, transportation    . Home health supplies by type and quantity ordered/delivered this visit include:  none  . Objective:  See interventions. Patient response to treatment:  Good tolerance to amb in the community with vitals remaining with in safe parameters. No pian or discomfort per pt report. Patient level of understanding of education provided:  Con' with HEP as able while away. Know limitations and take appropriate rest breaks. Assess of progress towards goals:   Pt able to demonstrate amb in the community in her session 300 feet on un/level surfaces, but with SBA throughput and CGA x 1 due to becoming light headed. She is progressing towards her goal of  MOD I. Continued need for the following skills:  general strengthening to improve L side strength, gait and balance to decrease fall risk and improve functional Ind. Plan for next visit:  Plans pending her f/u appointment with Cardiologist. She will be on hold x 1 week to travel for her Cardiologist appointment. The following discharge was discussed with the patient/caregiver :  Estimated DC ~ 11/9/2023 by Hermilo Jauregui, PT   [facility] HEART required?  Yes

## 2023-10-26 NOTE — HOME HEALTH
Skilled reason for visit: Patient is a 75year old female s/p CVA, nursing in for disease and medication education. Caregiver involvement: CG assists with food shoppin, food prep, transportation. Medications reviewed and all medications are available in the home this visit. The following education was provided regarding medications: Instructed patient/caregiver to notify SN/PT of signs and symptoms of anticoagulant adverse effects including bleeding gums, blood in urine or stool, easily bruising. Instructed on importance of fall prevention due to risk for bleeding. Instructed patient/caregiver to notify SN/PT of any signs and symptoms of antiplatelet adverse effects including SOB, bleeding longer/heavier with cuts, bruising, nose bleeds, and/or upset stomach. .    MD notified of any discrepancies/look a-like medications/medication interactions: n/a  Medications are effective at this time. Home health supplies by type and quantity ordered/delivered this visit include: n/a    Patient education provided this visit: INSTRUCTED PATIENT AND CG THAT SHOULD ANY NEEDS OR CONCERNS ARISE TO FIRST CALL OUR OFFICE, OR THE DR'S OFFICE  OR GO TO AN URGENT CARE CENTER AND NOT TO THE ED FOR NON-LIFE THREATENING EVENTS. IF IT IS LIFE THREATENING THEN CALL 911 OR GO TO THE CLOSEST ER. Patient was instructed on hypertension. Untreated hypertension can damage blood vessels, and after a blood vessel is damaged, fat and calcium can easily build up along the artery wall, forming a plaque and the blood flow through the blood vessel will be reduced. SN instructed patient and caregiver that myocardialinfarction and angina pectoris are potential complications of Coronary Artery Disease (CAD). Symptoms for myocardialinfarction tightness or pain in the chest, neck, back, or arms. Angina symptoms squeezing, pressure, heaviness, tightness, or pain in the chest. Call your doctor immediately for any symptoms.  Both patient and

## 2023-10-27 ENCOUNTER — APPOINTMENT (OUTPATIENT)
Facility: HOSPITAL | Age: 63
End: 2023-10-27
Payer: MEDICARE

## 2023-10-30 ENCOUNTER — HOME CARE VISIT (OUTPATIENT)
Age: 63
End: 2023-10-30
Payer: COMMERCIAL

## 2023-10-30 ENCOUNTER — APPOINTMENT (OUTPATIENT)
Facility: HOSPITAL | Age: 63
End: 2023-10-30
Payer: MEDICARE

## 2023-11-01 ENCOUNTER — HOME CARE VISIT (OUTPATIENT)
Age: 63
End: 2023-11-01
Payer: COMMERCIAL

## 2023-11-02 ENCOUNTER — HOME CARE VISIT (OUTPATIENT)
Age: 63
End: 2023-11-02
Payer: COMMERCIAL

## 2023-11-06 ENCOUNTER — HOME CARE VISIT (OUTPATIENT)
Age: 63
End: 2023-11-06
Payer: COMMERCIAL

## 2023-11-07 ENCOUNTER — HOME CARE VISIT (OUTPATIENT)
Age: 63
End: 2023-11-07
Payer: COMMERCIAL

## 2023-11-08 ENCOUNTER — HOME CARE VISIT (OUTPATIENT)
Age: 63
End: 2023-11-08
Payer: COMMERCIAL

## 2023-11-08 NOTE — CASE COMMUNICATION
SLP followed up with patient schedule evaluation. Patient states she remains in [place] for MD appointments and will be there for a minimum of 2 more weeks. At this time she wishes to be discharged from Homecare.

## 2023-11-09 ENCOUNTER — HOME CARE VISIT (OUTPATIENT)
Age: 63
End: 2023-11-09
Payer: COMMERCIAL

## 2023-11-13 NOTE — HOME HEALTH
OCCUPATIONAL THERAPY DISCHARGE: Mrs. Martinez has been seen by skilled home health occupational therapy services to address deficits in ADLs, IADLs, Energy conservation, Stairs, Functional Mobility, Bed Mobility, BUE function, and balance. Mrs. Mratinez is requesting to DC from home health services at this time due to continued appointments in [place] and pt inability to return home to continue with services. Pt agreeable to home health occupational therapy discharge at this time. ADLs: Unable to address goal due to pt requesting to be discharged from home health services. IADLs: Unable to address goal due to pt requesting to be discharged from home health services. Energy Conservation: Unable to address goal due to pt requesting to be discharged from home health services. Stairs: Pt has been educated on ascending and descending stairs to access full bathroom on second floor. Pt has progressed to completing stair negotiation with minimal assistance. Functional Mobility: Pt has been educated on proper techniques for completing toilet transfers and has progressed to completing with CGA. Bed Mobility: Pt has been educated on safety with bed mobility to include rolling and supine to sit. Pt has progressed to completing bed rolling with supervision. Pt has progressed to completing supine to sit transfer with moderate assistance. BUE Function: Unable to address goal due to pt requesting to be discharged from home health services. Balance: Unable to address goal due to pt requesting to be discharged from home health services.